# Patient Record
Sex: FEMALE | Race: WHITE | NOT HISPANIC OR LATINO | Employment: OTHER | URBAN - METROPOLITAN AREA
[De-identification: names, ages, dates, MRNs, and addresses within clinical notes are randomized per-mention and may not be internally consistent; named-entity substitution may affect disease eponyms.]

---

## 2017-02-17 ENCOUNTER — RECORDS - HEALTHEAST (OUTPATIENT)
Dept: LAB | Facility: CLINIC | Age: 57
End: 2017-02-17

## 2017-02-17 LAB
CHOLEST SERPL-MCNC: 188 MG/DL
FASTING STATUS PATIENT QL REPORTED: ABNORMAL
HDLC SERPL-MCNC: 40 MG/DL
LDLC SERPL CALC-MCNC: 105 MG/DL
TRIGL SERPL-MCNC: 215 MG/DL

## 2017-03-02 ENCOUNTER — AMBULATORY - HEALTHEAST (OUTPATIENT)
Dept: ADMINISTRATIVE | Facility: REHABILITATION | Age: 57
End: 2017-03-02

## 2017-03-02 DIAGNOSIS — H81.10 BPPV (BENIGN PAROXYSMAL POSITIONAL VERTIGO): ICD-10-CM

## 2017-03-17 ENCOUNTER — OFFICE VISIT - HEALTHEAST (OUTPATIENT)
Dept: OCCUPATIONAL THERAPY | Facility: REHABILITATION | Age: 57
End: 2017-03-17

## 2017-03-17 DIAGNOSIS — R26.81 UNSTEADINESS ON FEET: ICD-10-CM

## 2017-03-17 DIAGNOSIS — H81.12 BPPV (BENIGN PAROXYSMAL POSITIONAL VERTIGO), LEFT: ICD-10-CM

## 2017-03-17 DIAGNOSIS — Z78.9 DECREASED ACTIVITIES OF DAILY LIVING (ADL): ICD-10-CM

## 2017-03-30 ENCOUNTER — OFFICE VISIT - HEALTHEAST (OUTPATIENT)
Dept: OCCUPATIONAL THERAPY | Facility: REHABILITATION | Age: 57
End: 2017-03-30

## 2017-03-30 DIAGNOSIS — R26.81 UNSTEADINESS ON FEET: ICD-10-CM

## 2017-03-30 DIAGNOSIS — Z78.9 DECREASED ACTIVITIES OF DAILY LIVING (ADL): ICD-10-CM

## 2017-03-30 DIAGNOSIS — H81.12 BPPV (BENIGN PAROXYSMAL POSITIONAL VERTIGO), LEFT: ICD-10-CM

## 2017-04-07 ENCOUNTER — OFFICE VISIT - HEALTHEAST (OUTPATIENT)
Dept: OCCUPATIONAL THERAPY | Facility: REHABILITATION | Age: 57
End: 2017-04-07

## 2017-04-07 DIAGNOSIS — Z78.9 DECREASED ACTIVITIES OF DAILY LIVING (ADL): ICD-10-CM

## 2017-04-07 DIAGNOSIS — H81.12 BPPV (BENIGN PAROXYSMAL POSITIONAL VERTIGO), LEFT: ICD-10-CM

## 2017-04-07 DIAGNOSIS — R26.81 UNSTEADINESS ON FEET: ICD-10-CM

## 2018-03-07 ENCOUNTER — RECORDS - HEALTHEAST (OUTPATIENT)
Dept: LAB | Facility: CLINIC | Age: 58
End: 2018-03-07

## 2018-03-07 LAB
ALBUMIN SERPL-MCNC: 3.2 G/DL (ref 3.5–5)
ALP SERPL-CCNC: 84 U/L (ref 45–120)
ALT SERPL W P-5'-P-CCNC: 35 U/L (ref 0–45)
ANION GAP SERPL CALCULATED.3IONS-SCNC: 7 MMOL/L (ref 5–18)
AST SERPL W P-5'-P-CCNC: 38 U/L (ref 0–40)
BILIRUB SERPL-MCNC: 0.4 MG/DL (ref 0–1)
BUN SERPL-MCNC: 11 MG/DL (ref 8–22)
CALCIUM SERPL-MCNC: 9.6 MG/DL (ref 8.5–10.5)
CHLORIDE BLD-SCNC: 108 MMOL/L (ref 98–107)
CHOLEST SERPL-MCNC: 172 MG/DL
CO2 SERPL-SCNC: 23 MMOL/L (ref 22–31)
CREAT SERPL-MCNC: 0.78 MG/DL (ref 0.6–1.1)
CREAT UR-MCNC: 146.1 MG/DL
FASTING STATUS PATIENT QL REPORTED: ABNORMAL
GFR SERPL CREATININE-BSD FRML MDRD: >60 ML/MIN/1.73M2
GLUCOSE BLD-MCNC: 240 MG/DL (ref 70–125)
HDLC SERPL-MCNC: 37 MG/DL
LDLC SERPL CALC-MCNC: 92 MG/DL
MICROALBUMIN UR-MCNC: 4.68 MG/DL (ref 0–1.99)
MICROALBUMIN/CREAT UR: 32 MG/G
POTASSIUM BLD-SCNC: 3.9 MMOL/L (ref 3.5–5)
PROT SERPL-MCNC: 6.5 G/DL (ref 6–8)
SODIUM SERPL-SCNC: 138 MMOL/L (ref 136–145)
TRIGL SERPL-MCNC: 217 MG/DL

## 2018-09-25 ENCOUNTER — RECORDS - HEALTHEAST (OUTPATIENT)
Dept: LAB | Facility: CLINIC | Age: 58
End: 2018-09-25

## 2018-09-25 LAB
ALBUMIN SERPL-MCNC: 3.3 G/DL (ref 3.5–5)
ALP SERPL-CCNC: 106 U/L (ref 45–120)
ALT SERPL W P-5'-P-CCNC: 56 U/L (ref 0–45)
ANION GAP SERPL CALCULATED.3IONS-SCNC: 9 MMOL/L (ref 5–18)
AST SERPL W P-5'-P-CCNC: 43 U/L (ref 0–40)
BILIRUB SERPL-MCNC: 0.5 MG/DL (ref 0–1)
BUN SERPL-MCNC: 9 MG/DL (ref 8–22)
CALCIUM SERPL-MCNC: 10.2 MG/DL (ref 8.5–10.5)
CHLORIDE BLD-SCNC: 107 MMOL/L (ref 98–107)
CO2 SERPL-SCNC: 22 MMOL/L (ref 22–31)
CREAT SERPL-MCNC: 0.87 MG/DL (ref 0.6–1.1)
GFR SERPL CREATININE-BSD FRML MDRD: >60 ML/MIN/1.73M2
GLUCOSE BLD-MCNC: 329 MG/DL (ref 70–125)
POTASSIUM BLD-SCNC: 4.3 MMOL/L (ref 3.5–5)
PROT SERPL-MCNC: 6.6 G/DL (ref 6–8)
SODIUM SERPL-SCNC: 138 MMOL/L (ref 136–145)

## 2018-12-12 ENCOUNTER — RECORDS - HEALTHEAST (OUTPATIENT)
Dept: ADMINISTRATIVE | Facility: OTHER | Age: 58
End: 2018-12-12

## 2018-12-12 LAB
LAB AP CHARGES (HE HISTORICAL CONVERSION): NORMAL
PATH REPORT.COMMENTS IMP SPEC: NORMAL
PATH REPORT.COMMENTS IMP SPEC: NORMAL
PATH REPORT.FINAL DX SPEC: NORMAL
PATH REPORT.GROSS SPEC: NORMAL
PATH REPORT.MICROSCOPIC SPEC OTHER STN: NORMAL
PATH REPORT.RELEVANT HX SPEC: NORMAL
RESULT FLAG (HE HISTORICAL CONVERSION): NORMAL

## 2019-03-22 ENCOUNTER — RECORDS - HEALTHEAST (OUTPATIENT)
Dept: LAB | Facility: CLINIC | Age: 59
End: 2019-03-22

## 2019-03-22 LAB
ALBUMIN SERPL-MCNC: 3.3 G/DL (ref 3.5–5)
ALP SERPL-CCNC: 93 U/L (ref 45–120)
ALT SERPL W P-5'-P-CCNC: 51 U/L (ref 0–45)
ANION GAP SERPL CALCULATED.3IONS-SCNC: 7 MMOL/L (ref 5–18)
AST SERPL W P-5'-P-CCNC: 41 U/L (ref 0–40)
BILIRUB SERPL-MCNC: 0.5 MG/DL (ref 0–1)
BUN SERPL-MCNC: 9 MG/DL (ref 8–22)
CALCIUM SERPL-MCNC: 9.9 MG/DL (ref 8.5–10.5)
CHLORIDE BLD-SCNC: 106 MMOL/L (ref 98–107)
CHOLEST SERPL-MCNC: 155 MG/DL
CO2 SERPL-SCNC: 24 MMOL/L (ref 22–31)
CREAT SERPL-MCNC: 0.96 MG/DL (ref 0.6–1.1)
FASTING STATUS PATIENT QL REPORTED: NO
GFR SERPL CREATININE-BSD FRML MDRD: 59 ML/MIN/1.73M2
GLUCOSE BLD-MCNC: 388 MG/DL (ref 70–125)
HDLC SERPL-MCNC: 41 MG/DL
LDLC SERPL CALC-MCNC: 83 MG/DL
POTASSIUM BLD-SCNC: 4.4 MMOL/L (ref 3.5–5)
PROT SERPL-MCNC: 6.6 G/DL (ref 6–8)
SODIUM SERPL-SCNC: 137 MMOL/L (ref 136–145)
TRIGL SERPL-MCNC: 157 MG/DL

## 2019-04-23 ENCOUNTER — COMMUNICATION - HEALTHEAST (OUTPATIENT)
Dept: TELEHEALTH | Facility: CLINIC | Age: 59
End: 2019-04-23

## 2019-04-23 ENCOUNTER — HOSPITAL ENCOUNTER (OUTPATIENT)
Dept: MAMMOGRAPHY | Facility: CLINIC | Age: 59
Discharge: HOME | End: 2019-04-23
Attending: FAMILY MEDICINE

## 2019-04-23 DIAGNOSIS — Z12.31 SCREENING MAMMOGRAM, ENCOUNTER FOR: ICD-10-CM

## 2019-12-10 ENCOUNTER — RECORDS - HEALTHEAST (OUTPATIENT)
Dept: LAB | Facility: CLINIC | Age: 59
End: 2019-12-10

## 2019-12-11 LAB
ALBUMIN SERPL-MCNC: 3.3 G/DL (ref 3.5–5)
ALP SERPL-CCNC: 68 U/L (ref 45–120)
ALT SERPL W P-5'-P-CCNC: 32 U/L (ref 0–45)
ANION GAP SERPL CALCULATED.3IONS-SCNC: 8 MMOL/L (ref 5–18)
AST SERPL W P-5'-P-CCNC: 39 U/L (ref 0–40)
BILIRUB SERPL-MCNC: 0.7 MG/DL (ref 0–1)
BUN SERPL-MCNC: 10 MG/DL (ref 8–22)
CALCIUM SERPL-MCNC: 10.2 MG/DL (ref 8.5–10.5)
CHLORIDE BLD-SCNC: 107 MMOL/L (ref 98–107)
CO2 SERPL-SCNC: 26 MMOL/L (ref 22–31)
CREAT SERPL-MCNC: 0.98 MG/DL (ref 0.6–1.1)
GFR SERPL CREATININE-BSD FRML MDRD: 58 ML/MIN/1.73M2
GLUCOSE BLD-MCNC: 146 MG/DL (ref 70–125)
POTASSIUM BLD-SCNC: 4.2 MMOL/L (ref 3.5–5)
PROT SERPL-MCNC: 6.5 G/DL (ref 6–8)
SODIUM SERPL-SCNC: 141 MMOL/L (ref 136–145)

## 2020-03-04 ENCOUNTER — RECORDS - HEALTHEAST (OUTPATIENT)
Dept: LAB | Facility: CLINIC | Age: 60
End: 2020-03-04

## 2020-03-04 LAB
CHOLEST SERPL-MCNC: 161 MG/DL
FASTING STATUS PATIENT QL REPORTED: YES
HDLC SERPL-MCNC: 41 MG/DL
LDLC SERPL CALC-MCNC: 86 MG/DL
TRIGL SERPL-MCNC: 172 MG/DL

## 2020-07-27 ENCOUNTER — COMMUNICATION - HEALTHEAST (OUTPATIENT)
Dept: CARDIOLOGY | Facility: CLINIC | Age: 60
End: 2020-07-27

## 2020-07-28 ENCOUNTER — AMBULATORY - HEALTHEAST (OUTPATIENT)
Dept: CARDIOLOGY | Facility: CLINIC | Age: 60
End: 2020-07-28

## 2020-07-28 ENCOUNTER — COMMUNICATION - HEALTHEAST (OUTPATIENT)
Dept: CARDIOLOGY | Facility: CLINIC | Age: 60
End: 2020-07-28

## 2020-07-28 ENCOUNTER — OFFICE VISIT - HEALTHEAST (OUTPATIENT)
Dept: CARDIOLOGY | Facility: CLINIC | Age: 60
End: 2020-07-28

## 2020-07-28 DIAGNOSIS — R79.89 POSITIVE D DIMER: ICD-10-CM

## 2020-07-28 DIAGNOSIS — R06.02 SOB (SHORTNESS OF BREATH): ICD-10-CM

## 2020-07-28 DIAGNOSIS — R07.89 OTHER CHEST PAIN: ICD-10-CM

## 2020-07-28 DIAGNOSIS — R06.09 DYSPNEA ON EXERTION: ICD-10-CM

## 2020-07-28 LAB — D DIMER PPP FEU-MCNC: 0.85 FEU UG/ML

## 2020-07-28 RX ORDER — PIOGLITAZONEHYDROCHLORIDE 15 MG/1
15 TABLET ORAL DAILY
Status: SHIPPED | COMMUNITY
Start: 2020-05-01 | End: 2023-01-01

## 2020-07-28 RX ORDER — GLIPIZIDE 10 MG/1
10 TABLET, FILM COATED, EXTENDED RELEASE ORAL 2 TIMES DAILY
Status: SHIPPED | COMMUNITY
Start: 2020-05-27

## 2020-07-28 RX ORDER — DOXAZOSIN 8 MG/1
4 TABLET ORAL DAILY
Status: SHIPPED | COMMUNITY
Start: 2019-05-07

## 2020-07-28 RX ORDER — HYDROCHLOROTHIAZIDE 25 MG/1
25 TABLET ORAL DAILY
Status: SHIPPED | COMMUNITY
Start: 2020-05-28

## 2020-07-28 RX ORDER — METFORMIN HCL 500 MG
1000 TABLET, EXTENDED RELEASE 24 HR ORAL DAILY
Status: SHIPPED | COMMUNITY
Start: 2020-06-10 | End: 2023-01-01

## 2020-07-28 ASSESSMENT — MIFFLIN-ST. JEOR: SCORE: 2046.41

## 2020-07-29 ENCOUNTER — HOSPITAL ENCOUNTER (OUTPATIENT)
Dept: CT IMAGING | Facility: CLINIC | Age: 60
Discharge: HOME | End: 2020-07-29
Attending: INTERNAL MEDICINE

## 2020-07-29 DIAGNOSIS — R79.89 POSITIVE D DIMER: ICD-10-CM

## 2020-07-29 DIAGNOSIS — R06.02 SOB (SHORTNESS OF BREATH): ICD-10-CM

## 2020-08-10 ENCOUNTER — HOSPITAL ENCOUNTER (OUTPATIENT)
Dept: NUCLEAR MEDICINE | Facility: CLINIC | Age: 60
Discharge: HOME | End: 2020-08-10
Attending: INTERNAL MEDICINE

## 2020-08-10 ENCOUNTER — HOSPITAL ENCOUNTER (OUTPATIENT)
Dept: CARDIOLOGY | Facility: CLINIC | Age: 60
Discharge: HOME | End: 2020-08-10
Attending: INTERNAL MEDICINE

## 2020-08-10 LAB
CV STRESS CURRENT BP HE: NORMAL
CV STRESS CURRENT HR HE: 50
CV STRESS CURRENT HR HE: 52
CV STRESS CURRENT HR HE: 52
CV STRESS CURRENT HR HE: 54
CV STRESS CURRENT HR HE: 54
CV STRESS CURRENT HR HE: 56
CV STRESS CURRENT HR HE: 57
CV STRESS CURRENT HR HE: 58
CV STRESS CURRENT HR HE: 60
CV STRESS CURRENT HR HE: 60
CV STRESS CURRENT HR HE: 61
CV STRESS CURRENT HR HE: 61
CV STRESS CURRENT HR HE: 63
CV STRESS CURRENT HR HE: 64
CV STRESS CURRENT HR HE: 68
CV STRESS CURRENT HR HE: 69
CV STRESS CURRENT HR HE: 73
CV STRESS CURRENT HR HE: 74
CV STRESS CURRENT HR HE: 87
CV STRESS DEVIATION TIME HE: NORMAL
CV STRESS ECHO PERCENT HR HE: NORMAL
CV STRESS EXERCISE STAGE HE: NORMAL
CV STRESS FINAL RESTING BP HE: NORMAL
CV STRESS FINAL RESTING HR HE: 61
CV STRESS MAX HR HE: 92
CV STRESS MAX TREADMILL GRADE HE: 0
CV STRESS MAX TREADMILL SPEED HE: 0
CV STRESS PEAK DIA BP HE: NORMAL
CV STRESS PEAK SYS BP HE: NORMAL
CV STRESS PHASE HE: NORMAL
CV STRESS PROTOCOL HE: NORMAL
CV STRESS RESTING PT POSITION HE: NORMAL
CV STRESS RESTING PT POSITION HE: NORMAL
CV STRESS ST DEVIATION AMOUNT HE: NORMAL
CV STRESS ST DEVIATION ELEVATION HE: NORMAL
CV STRESS ST EVELATION AMOUNT HE: NORMAL
CV STRESS TEST TYPE HE: NORMAL
CV STRESS TOTAL STAGE TIME MIN 1 HE: NORMAL
RATE PRESSURE PRODUCT: NORMAL
STRESS ECHO BASELINE DIASTOLIC HE: 83
STRESS ECHO BASELINE HR: 73
STRESS ECHO BASELINE SYSTOLIC BP: 141
STRESS ECHO CALCULATED PERCENT HR: 58 %
STRESS ECHO LAST STRESS DIASTOLIC BP: 60
STRESS ECHO LAST STRESS HR: 57
STRESS ECHO LAST STRESS SYSTOLIC BP: 130
STRESS ECHO TARGET HR: 159

## 2020-08-14 ENCOUNTER — COMMUNICATION - HEALTHEAST (OUTPATIENT)
Dept: CARDIOLOGY | Facility: CLINIC | Age: 60
End: 2020-08-14

## 2020-12-02 ENCOUNTER — RECORDS - HEALTHEAST (OUTPATIENT)
Dept: ADMINISTRATIVE | Facility: OTHER | Age: 60
End: 2020-12-02

## 2020-12-02 LAB
LAB AP CHARGES (HE HISTORICAL CONVERSION): NORMAL
PATH REPORT.COMMENTS IMP SPEC: NORMAL
PATH REPORT.FINAL DX SPEC: NORMAL
PATH REPORT.GROSS SPEC: NORMAL
PATH REPORT.MICROSCOPIC SPEC OTHER STN: NORMAL
PATH REPORT.RELEVANT HX SPEC: NORMAL
RESULT FLAG (HE HISTORICAL CONVERSION): NORMAL

## 2021-03-11 ENCOUNTER — RECORDS - HEALTHEAST (OUTPATIENT)
Dept: LAB | Facility: CLINIC | Age: 61
End: 2021-03-11

## 2021-03-11 LAB
ALBUMIN SERPL-MCNC: 3.2 G/DL (ref 3.5–5)
ALP SERPL-CCNC: 79 U/L (ref 45–120)
ALT SERPL W P-5'-P-CCNC: 43 U/L (ref 0–45)
ANION GAP SERPL CALCULATED.3IONS-SCNC: 10 MMOL/L (ref 5–18)
AST SERPL W P-5'-P-CCNC: 69 U/L (ref 0–40)
BILIRUB SERPL-MCNC: 0.6 MG/DL (ref 0–1)
BUN SERPL-MCNC: 14 MG/DL (ref 8–22)
CALCIUM SERPL-MCNC: 9.6 MG/DL (ref 8.5–10.5)
CHLORIDE BLD-SCNC: 107 MMOL/L (ref 98–107)
CHOLEST SERPL-MCNC: 167 MG/DL
CO2 SERPL-SCNC: 23 MMOL/L (ref 22–31)
CREAT SERPL-MCNC: 1.09 MG/DL (ref 0.6–1.1)
FASTING STATUS PATIENT QL REPORTED: ABNORMAL
GFR SERPL CREATININE-BSD FRML MDRD: 51 ML/MIN/1.73M2
GLUCOSE BLD-MCNC: 227 MG/DL (ref 70–125)
HDLC SERPL-MCNC: 38 MG/DL
LDLC SERPL CALC-MCNC: 99 MG/DL
POTASSIUM BLD-SCNC: 4.1 MMOL/L (ref 3.5–5)
PROT SERPL-MCNC: 6.6 G/DL (ref 6–8)
SODIUM SERPL-SCNC: 140 MMOL/L (ref 136–145)
TRIGL SERPL-MCNC: 149 MG/DL

## 2021-05-25 ENCOUNTER — RECORDS - HEALTHEAST (OUTPATIENT)
Dept: ADMINISTRATIVE | Facility: CLINIC | Age: 61
End: 2021-05-25

## 2021-05-27 ENCOUNTER — RECORDS - HEALTHEAST (OUTPATIENT)
Dept: ADMINISTRATIVE | Facility: CLINIC | Age: 61
End: 2021-05-27

## 2021-05-28 ENCOUNTER — RECORDS - HEALTHEAST (OUTPATIENT)
Dept: ADMINISTRATIVE | Facility: CLINIC | Age: 61
End: 2021-05-28

## 2021-05-29 ENCOUNTER — RECORDS - HEALTHEAST (OUTPATIENT)
Dept: ADMINISTRATIVE | Facility: CLINIC | Age: 61
End: 2021-05-29

## 2021-05-30 ENCOUNTER — RECORDS - HEALTHEAST (OUTPATIENT)
Dept: ADMINISTRATIVE | Facility: CLINIC | Age: 61
End: 2021-05-30

## 2021-06-02 ENCOUNTER — RECORDS - HEALTHEAST (OUTPATIENT)
Dept: ADMINISTRATIVE | Facility: CLINIC | Age: 61
End: 2021-06-02

## 2021-06-04 VITALS
DIASTOLIC BLOOD PRESSURE: 70 MMHG | HEART RATE: 68 BPM | RESPIRATION RATE: 16 BRPM | WEIGHT: 293 LBS | SYSTOLIC BLOOD PRESSURE: 118 MMHG | BODY MASS INDEX: 50.02 KG/M2 | HEIGHT: 64 IN

## 2021-06-09 NOTE — PROGRESS NOTES
[facility] Daily Progress     Patient Name: Lisandra Elias  Date: 2017  Visit #: 3  Referral Diagnosis: BPPV  Referring provider: Pritesh Ward MD  Visit Diagnosis:     ICD-10-CM    1. BPPV (benign paroxysmal positional vertigo), left H81.12    2. Unsteadiness on feet R26.81    3. Decreased activities of daily living (ADL) Z78.9          Assessment:     Patient is appropriate to continue with skilled occupational therapy intervention, as indicated by initial plan of care. Patient states that she is improved but symptoms are not resolved.Instructed on VOR exercise today.    Goal Status:  Patient able to perform bed mobility: without vertigo;in 12 weeks  Patient will turn head: without vertigo;without dizziness;for driving;for work;in 12 weeks  Patient will look up / down: without vertigo;without dizziness;for drinking;for reading;in 12 weeks    Plan / Patient Education:     Continue with initial plan of care. Progress with home program as tolerated.    Subjective:     Pain Ratin    Objective:   Subjective progress relative to last visit:  Intensity of dizziness is:  less  Frequency of dizziness is: less  Duration of dizziness is: less  Balance is:  better    Positional Tests:  Hallpike Right:  Negative  Hallpike Left:  Negative  Head Roll Right: Negative  Head Roll Left:  Negative    Treatment Today:   X1 viewing-5 seconds-instructed  TREATMENT MINUTES COMMENTS   Evaluation     Self-care/ Home management     Neuromuscular Re-education 15    Canalith repositioning procedure           Total 15    Blank areas are intentional and mean the treatment did not include these items.          Yarely Mcgill  2017  12:59 PM

## 2021-06-09 NOTE — PROGRESS NOTES
[facility] Daily Progress     Patient Name: Lisandra Elias  Date: 3/30/2017  Visit #: 2  Referral Diagnosis: BPPV  Referring provider: Pritesh Ward MD  Visit Diagnosis:     ICD-10-CM    1. BPPV (benign paroxysmal positional vertigo), left H81.12    2. Unsteadiness on feet R26.81    3. Decreased activities of daily living (ADL) Z78.9          Assessment:     Patient is appropriate to continue with skilled occupational therapy intervention, as indicated by initial plan of care. Patient states that she is significantly improved but symptoms are not resolved. Treated with left Epley maneuver today.    Goal Status:  Patient able to perform bed mobility: without vertigo;in 12 weeks  Patient will turn head: without vertigo;without dizziness;for driving;for work;in 12 weeks  Patient will look up / down: without vertigo;without dizziness;for drinking;for reading;in 12 weeks    Plan / Patient Education:     Continue with initial plan of care. Progress with home program as tolerated.    Subjective:     Pain Ratin    Objective:   Subjective progress relative to last visit:  Intensity of dizziness is:  less  Frequency of dizziness is: less  Duration of dizziness is: less  Balance is:  better    Positional Tests:  Hallpike Right:  Negative  Hallpike Left:  Abnormal - Nystagmus left torsional, up beating    Treatment Today: Treated with left Epley maneuver X 2. Signs and symptoms appear negative on the second Epley.  TREATMENT MINUTES COMMENTS   Evaluation     Self-care/ Home management     Neuromuscular Re-education     Canalith repositioning procedure 25          Total 25    Blank areas are intentional and mean the treatment did not include these items.          Yarely Mcgill  3/30/2017  12:59 PM

## 2021-06-09 NOTE — PROGRESS NOTES
[facility]   Vestibular Initial Evaluation    Patient Name: Lisandra Elias  Date of evaluation: 3/17/2017  Referral Diagnosis: BPPV  Referring provider: Pritesh Ward MD  Visit Diagnosis:     ICD-10-CM    1. BPPV (benign paroxysmal positional vertigo), left H81.12    2. Unsteadiness on feet R26.81    3. Decreased activities of daily living (ADL) Z78.9        Assessment:      Patient has positive left [place] - Hallpikes. Treated with left Epley maneuver.    Goals:  Patient able to perform bed mobility: without vertigo;in 12 weeks  Patient will turn head: without vertigo;without dizziness;for driving;for work;in 12 weeks  Patient will look up / down: without vertigo;without dizziness;for drinking;for reading;in 12 weeks    Patient's expectations/goals are realistic.    Barriers to Learning or Achieving Goals:  No Barriers.       Plan / Patient Instructions:        Plan of Care:   Communication with: Referral Source  Patient Related Instruction: Nature of Condition;Treatment plan and rationale;Basis of treatment;Expected outcome  Times per Week: 1  Number of Weeks: 12  Number of Visits: 12  Neuromuscular Reeducation: vestibular  Canolith Repostioning:      Plan for next visit: repeat left Epley maneuver     Subjective:         History of Present Illness:    Lisandra is a 56 y.o. female who presents to therapy today with complaints of vertigo, nausea and unsteadiness. Patient had sudden onset of symptoms about 2 weeks ago. Symptoms are intermittent. She has a history of similar symptoms in  that were successfully treated with VRT. Patient reports a constant bilateral ear pain. Patient denies hearing changes. She does have migraine headaches.    Pain Ratin    Functional limitations are described as occurring with:   balance, bending, bed mobility, head turns, looking up or down, stepping over curbs       Objective:      Note: Items left blank indicates the item was not performed or not indicated at the  time of the evaluation.    Patient Outcome Measures: DHI 52      Vestibular Disorder Examination  1. BPPV (benign paroxysmal positional vertigo), left     2. Unsteadiness on feet     3. Decreased activities of daily living (ADL)       Precautions/Restrictions: None  Posture Observation:      General standing posture is fair.    ROM:  Not Tested    Strength: Not Tested    Functional Mobility: fair      Oculomotor Assessment: Not tested    VOR Function: Not tested    Positional Tests:  Hallpike Right:  Negative  Hallpike Left:  Abnormal - Nystagmus left torsional, up beating    Balance Assessment: Not tested    Treatment Today:  Treated with left Epley maneuver X2. Signs and symptoms appear negative on second treatment.  TREATMENT MINUTES COMMENTS   Evaluation 15    Self-care/ Home management     Neuromuscular Re-education     Canalith repositioning procedure 20          Total 35    Blank areas are intentional and mean the treatment did not include these items.     OT Evaluation Code: (Please list factors)   Comorbidities: None   Profile/History Review: Brief    Need for eval modification: No    # Treatment options: Limited    Clinical Decision Making:  Low      Occupational Profile/ Medical and Therapy History and Comorbidities Occupational Performance Clinical Decision Making   (Complexity)   brief history with review of medical/therapy records related to the presenting problem.  No comorbidities 1-3 Performance deficits that result in activity limitations and/or participation restrictions.    No Assessment Modification  Low complexity, which includes  problem-focused assessments, and consideration of a limited number of treatment options.      expanded review of medical/therapy records and additional review of physical, cognitive and psychosocial history.    May have comorbidities 3-5 Performance deficits that result in activity limitations and/or participation restrictions.    Minimal to moderate modification of  assessment Moderate complexity, which includes analysis of data from detailed assessments, and consideration of several treatment options.         Review of medical/therapy records and extensive additional review of physical, cognitive and psychosocial history.  Comorbidities affect occupational performance 5 or more Performance deficits that result in activity limitations and/or participation restrictions.    Significant modification of assessment High complexity, analysis of  Occupational profile and data,  Comprehensive assessments, multiple treatment options.            Yarely Mcgill  3/17/2017  8:39 AM

## 2021-06-10 NOTE — PROGRESS NOTES
[facility] Discharge Summary  Patient Name: Lisandra Elias  Date: 5/23/2017  Referral Diagnosis: BPPV  Referring provider: Pritesh Ward MD  Visit Diagnosis:   1. BPPV (benign paroxysmal positional vertigo), left     2. Unsteadiness on feet     3. Decreased activities of daily living (ADL)         Goals: Progressing toward  Patient able to perform bed mobility: without vertigo;in 12 weeks  Patient will turn head: without vertigo;without dizziness;for driving;for work;in 12 weeks  Patient will look up / down: without vertigo;without dizziness;for drinking;for reading;in 12 weeks    Patient was seen for 3 visits between 3-17-17 and 4-7-17.    Therapy will be discontinued at this time.  The patient will need a new referral to resume.    Thank you for your referral.  Yarely Mcgill  5/23/2017  7:35 AM

## 2021-06-10 NOTE — TELEPHONE ENCOUNTER
Called patient and advised the she will receive call with results as soon as test is reviewed by Dr. Nava. Patient verbalized understanding. -ejb    ----- Message from Nemesio Estrada sent at 8/14/2020 12:23 PM CDT -----  Regarding: [facility] PT / TEST RESULTS  General phone call:    Caller: Lisandra Bolaños     Primary cardiologist: DANIEL     Detailed reason for call: Pt states she had a stress test on 8/10 but has not heard back regarding her results. Please call pt back.     Best phone number: [phone]    Best time to contact: Anytime     Ok to leave a detailed message? Yes     Device? No     Additional Info: Pt has a follow up phone appt with [facility] on 9/10/20.

## 2021-06-10 NOTE — TELEPHONE ENCOUNTER
Received late-nurse call from pt who was very anxious about elevated D-dimer results. Pt reports she was instructed by nursing staff to schedule an urgent CT (see D-dimer results comments). Pt was able to be scheduled for tomorrow morning at 7 AM. She asks if it is ok to wait till tomorrow or does she need to seek ED services. Informed pt that if she developed any new or worsening symptoms such as SOB she should seek ED. Also informed her that she can call our clinic number after hours and be transferred to care [facility] if urgent questions come up.

## 2021-06-10 NOTE — TELEPHONE ENCOUNTER
Wellness Screening Tool  Symptom Screening:  Do you have one of the following NEW symptoms:    Fever (subjective or >100.0)?  No    A new cough?  No    Shortness of breath?  No     Chills? No     New loss of taste or smell? No     Generalized body aches? No     New persistent headache? No     New sore throat? No     Nausea, vomiting, or diarrhea?  No    Within the past 3 weeks, have you been exposed to someone with a known positive illness below:    COVID-19 (known or suspected)?  No    Chicken pox?  No    Mealses?  No    Pertussis?  No    Patient notified of visitor policy- They may have one person accompany them to their appointment, but they will need to wear a mask and will be screened upon arrival for symptoms: Yes  Pt informed to wear a mask: Yes  Pt notified if they develop any symptoms listed above, prior to their appointment, they are to call the clinic directly at [phone] for further instructions.  Yes  Patient's appointment status: Patient will be seen in clinic as scheduled on Tues 7-28-20 @ 1120 in [facility] with Dr. Nava.  mg

## 2021-06-14 ENCOUNTER — HOSPITAL ENCOUNTER (OUTPATIENT)
Dept: MAMMOGRAPHY | Facility: CLINIC | Age: 61
Discharge: HOME | End: 2021-06-14
Payer: COMMERCIAL

## 2021-06-14 DIAGNOSIS — Z12.31 VISIT FOR SCREENING MAMMOGRAM: ICD-10-CM

## 2021-06-29 NOTE — PROGRESS NOTES
Progress Notes by Georgie Nava MD at 7/28/2020 11:20 AM     Author: Georgie Nava MD Service: -- Author Type: Physician    Filed: 7/29/2020 11:36 AM Encounter Date: 7/28/2020 Status: Signed    : Georgie Nava MD (Physician)           Thank you, Dr. Asif, for asking us to see Lisandra Elias at the [facility].      Assessment/Recommendations   Assessment:    1. Pain: chest pain, pain down left arm not necessarily with exertion.  May be related to anxiety  2. hypertension  3. Morbid obesity  4. Diabetes mellitus type II    Plan:  1. Check D dimer  2. Lexiscan nuclear stress testing  3. Follow up with PMD for further evaluation if continued symptoms and stress testing unremarkable  Follow up with me in one months.  If further issues with palpitations consider dilan monitor     History of Present Illness    Ms. Lisandra Elias is a 59 y.o. female with history of diabetes mellitus type II, hypertension, obesity, anxiety who is here in rapid access clinic due to various symptoms after being referred from the ED.  She has been feeling palpitations, nausea, discomfort in the upper back radiating to her left arm.   The pain is not necessarily with exertion. She occasionally feels short of breath with nausea.  No fevers or cough.  No exertional symptoms.  She also has intermittent pain in the lower chest upper stomach.          Physical Examination Review of Systems   Vitals:    07/28/20 1125   BP: 118/70   Pulse: 68   Resp: 16     Body mass index is 56.82 kg/m .  Wt Readings from Last 3 Encounters:   07/28/20 (!) 331 lb (150.1 kg)   07/18/20 (!) 315 lb (142.9 kg)   03/17/19 (!) 282 lb (127.9 kg)       General Appearance:   alert, no apparent distress   HEENT:  no scleral icterus; the mucous membranes are pink and moist                                  Neck: No jvd   Chest: the spine is straight and the chest is symmetric   Lungs:   respirations unlabored; the lungs  are clear to auscultation   Cardiovascular:   regular rhythm with normal first and second heart sounds and no murmurs or gallops   Abdomen:  no organomegaly, masses, bruits, or tenderness; bowel sounds are present   Extremities: no cyanosis, clubbing, or edema   Skin: no xanthelasma    General: Weight Gain  Eyes: WNL  Ears/Nose/Throat: WNL  Lungs: WNL  Heart: Chest Pain, Arm Pain, Shortness of Breath with activity, Leg Swelling(palpitpations)  Stomach: Constipation, Nausea  Bladder: Frequent Urination at Night  Muscle/Joints: Muscle Weakness  Skin: WNL  Nervous System: Dizziness  Mental Health: WNL     Blood: WNL     Medical History  Surgical History Family History Social History   Past Medical History:   Diagnosis Date   ? Chronic pain    ? Diabetes mellitus (H)    ? History of anesthesia complications difficulty waking up   ? HTN (hypertension)    ? Migraine     Past Surgical History:   Procedure Laterality Date   ? APPENDECTOMY     ? HYSTERECTOMY N/A 2000's   ? OOPHORECTOMY Bilateral 2000's   ? ND EDG US EXAM SURGICAL ALTER STOM DUODENUM/JEJUNUM N/A 11/10/2014    Procedure: ESOPHAGOGASTRODUODENOSCOPY/ENDOSCOPIC ULTRASOUND;  Surgeon: rBian Lawrence MD;  Location: [facility];  Service: Gastroenterology   ? WISDOM TOOTH EXTRACTION N/A     Family History   Problem Relation Age of Onset   ? Heart disease Father     Social History     Socioeconomic History   ? Marital status:      Spouse name: Not on file   ? Number of children: Not on file   ? Years of education: Not on file   ? Highest education level: Not on file   Occupational History   ? Not on file   Social Needs   ? Financial resource strain: Not on file   ? Food insecurity     Worry: Not on file     Inability: Not on file   ? Transportation needs     Medical: Not on file     Non-medical: Not on file   Tobacco Use   ? Smoking status: Former Smoker     Packs/day: 1.00     Types: Cigarettes     Start date: 1974     Last attempt to quit: 1979      Years since quittin.6   ? Smokeless tobacco: Never Used   Substance and Sexual Activity   ? Alcohol use: Yes     Alcohol/week: 1.0 standard drinks     Types: 1 Glasses of wine per week   ? Drug use: Never   ? Sexual activity: Not on file   Lifestyle   ? Physical activity     Days per week: Not on file     Minutes per session: Not on file   ? Stress: Not on file   Relationships   ? Social connections     Talks on phone: Not on file     Gets together: Not on file     Attends Buddhism service: Not on file     Active member of club or organization: Not on file     Attends meetings of clubs or organizations: Not on file     Relationship status: Not on file   ? Intimate partner violence     Fear of current or ex partner: Not on file     Emotionally abused: Not on file     Physically abused: Not on file     Forced sexual activity: Not on file   Other Topics Concern   ? Not on file   Social History Narrative   ? Not on file          Medications  Allergies   Current Outpatient Medications   Medication Sig Dispense Refill   ? aspirin 81 MG EC tablet Take 81 mg by mouth daily.     ? blood sugar diagnostic (GLUCOSE BLOOD) Strp OneTouch Ultra Blue In Vitro Strip     Test 3 times daily     ? cefdinir (OMNICEF) 300 MG capsule Take 300 mg by mouth 2 (two) times a day.     ? doxazosin (CARDURA) 8 MG tablet Take 8 mg by mouth daily.     ? fluconazole (DIFLUCAN) 50 MG tablet Take 50 mg by mouth daily.     ? fluticasone propionate (FLONASE) 50 mcg/actuation nasal spray Apply 1 spray into each nostril daily.     ? glipiZIDE (GLUCOTROL XL) 10 MG 24 hr tablet Take 10 mg by mouth daily.     ? hydroCHLOROthiazide (HYDRODIURIL) 25 MG tablet Take 25 mg by mouth daily.     ? LANCETS MISC OneTouch Lancets Miscellaneous     ? losartan (COZAAR) 100 MG tablet Take 100 mg by mouth daily.     ? metFORMIN (GLUCOPHAGE-XR) 500 MG 24 hr tablet Take 1,000 mg by mouth daily.     ? metoprolol (TOPROL-XL) 50 MG 24 hr tablet Take 50 mg by mouth daily.      ? pioglitazone (ACTOS) 15 MG tablet Take 15 mg by mouth daily.     ? albuterol (ACCUNEB) 0.63 mg/3 mL nebulizer solution Take 1 ampule by nebulization 2 (two) times a day.     ? azithromycin (ZITHROMAX) 250 MG tablet Take 250 mg by mouth daily. Take 500 mg (2 x 250 mg tablets) on day 1 followed by 250 mg (1 tablet) on days 2-5.     ? ergocalciferol (VITAMIN D2) 50,000 unit capsule Take 50,000 Units by mouth once a week. 2,000 U     ? HYDROcodone-acetaminophen (XODOL) 5-300 mg per tablet Take 1 tablet by mouth. Every 4-6 hours as needed for pain     ? hydrOXYzine pamoate (VISTARIL) 25 MG capsule Take 1 capsule (25 mg total) by mouth 3 (three) times a day as needed for anxiety. 30 capsule 0   ? loperamide (IMODIUM A-D) 2 mg tablet Take 1 mg by mouth daily.     ? loratadine (CLARITIN) 10 mg tablet Take 10 mg by mouth daily.     ? multivitamin therapeutic (THERAGRAN) tablet Take 1 tablet by mouth daily.     ? traMADol (ULTRAM) 50 mg tablet Take 50 mg by mouth every 6 (six) hours as needed for pain.       No current facility-administered medications for this visit.       Allergies   Allergen Reactions   ? Amlodipine    ? Amoxicillin-Pot Clavulanate    ? Clotrimazole-Betamethasone    ? Codeine    ? Gemfibrozil    ? Ibuprofen    ? Lisinopril          Lab Results    Chemistry/lipid CBC Cardiac Enzymes/BNP/TSH/INR   Lab Results   Component Value Date    CHOL 161 03/04/2020    HDL 41 (L) 03/04/2020    LDLCALC 86 03/04/2020    TRIG 172 (H) 03/04/2020    CREATININE 0.92 07/18/2020    BUN 9 07/18/2020    K 4.3 07/18/2020     07/18/2020     07/18/2020    CO2 20 (L) 07/18/2020    Lab Results   Component Value Date    WBC 5.9 07/18/2020    HGB 14.5 07/18/2020    HCT 43.1 07/18/2020    MCV 91 07/18/2020     (L) 07/18/2020    Lab Results   Component Value Date    CKMB 3 10/31/2013    TROPONINI <0.01 07/18/2020    BNP 34 07/18/2020    INR 1.13 (H) 10/31/2013

## 2021-07-13 ENCOUNTER — RECORDS - HEALTHEAST (OUTPATIENT)
Dept: ADMINISTRATIVE | Facility: CLINIC | Age: 61
End: 2021-07-13

## 2021-07-21 ENCOUNTER — RECORDS - HEALTHEAST (OUTPATIENT)
Dept: ADMINISTRATIVE | Facility: CLINIC | Age: 61
End: 2021-07-21

## 2021-09-14 ENCOUNTER — HOSPITAL ENCOUNTER (EMERGENCY)
Facility: CLINIC | Age: 61
Discharge: HOME | End: 2021-09-15
Attending: EMERGENCY MEDICINE | Admitting: EMERGENCY MEDICINE
Payer: COMMERCIAL

## 2021-09-14 DIAGNOSIS — N30.91 HEMORRHAGIC CYSTITIS: ICD-10-CM

## 2021-09-14 PROCEDURE — 99283 EMERGENCY DEPT VISIT LOW MDM: CPT

## 2021-09-14 PROCEDURE — 87086 URINE CULTURE/COLONY COUNT: CPT | Performed by: EMERGENCY MEDICINE

## 2021-09-15 VITALS
HEART RATE: 84 BPM | RESPIRATION RATE: 24 BRPM | TEMPERATURE: 98.8 F | WEIGHT: 293 LBS | SYSTOLIC BLOOD PRESSURE: 133 MMHG | OXYGEN SATURATION: 94 % | DIASTOLIC BLOOD PRESSURE: 72 MMHG | BODY MASS INDEX: 57.16 KG/M2

## 2021-09-15 LAB
ALBUMIN SERPL-MCNC: 3.3 G/DL (ref 3.5–5)
ALP SERPL-CCNC: 62 U/L (ref 45–120)
ALT SERPL W P-5'-P-CCNC: 22 U/L (ref 0–45)
ANION GAP SERPL CALCULATED.3IONS-SCNC: 9 MMOL/L (ref 5–18)
AST SERPL W P-5'-P-CCNC: 29 U/L (ref 0–40)
BASOPHILS # BLD AUTO: 0.1 10E3/UL (ref 0–0.2)
BASOPHILS NFR BLD AUTO: 1 %
BILIRUB SERPL-MCNC: 0.5 MG/DL (ref 0–1)
BUN SERPL-MCNC: 16 MG/DL (ref 8–22)
CALCIUM SERPL-MCNC: 10.2 MG/DL (ref 8.5–10.5)
CHLORIDE BLD-SCNC: 108 MMOL/L (ref 98–107)
CO2 SERPL-SCNC: 25 MMOL/L (ref 22–31)
CREAT SERPL-MCNC: 1.13 MG/DL (ref 0.6–1.1)
EOSINOPHIL # BLD AUTO: 0.2 10E3/UL (ref 0–0.7)
EOSINOPHIL NFR BLD AUTO: 2 %
ERYTHROCYTE [DISTWIDTH] IN BLOOD BY AUTOMATED COUNT: 13.9 % (ref 10–15)
GFR SERPL CREATININE-BSD FRML MDRD: 52 ML/MIN/1.73M2
GLUCOSE BLD-MCNC: 158 MG/DL (ref 70–125)
HCT VFR BLD AUTO: 44.4 % (ref 35–47)
HGB BLD-MCNC: 14.7 G/DL (ref 11.7–15.7)
IMM GRANULOCYTES # BLD: 0 10E3/UL
IMM GRANULOCYTES NFR BLD: 0 %
INR PPP: 1.1 (ref 0.85–1.15)
LYMPHOCYTES # BLD AUTO: 2.5 10E3/UL (ref 0.8–5.3)
LYMPHOCYTES NFR BLD AUTO: 31 %
MCH RBC QN AUTO: 30.3 PG (ref 26.5–33)
MCHC RBC AUTO-ENTMCNC: 33.1 G/DL (ref 31.5–36.5)
MCV RBC AUTO: 92 FL (ref 78–100)
MONOCYTES # BLD AUTO: 1.2 10E3/UL (ref 0–1.3)
MONOCYTES NFR BLD AUTO: 14 %
NEUTROPHILS # BLD AUTO: 4.3 10E3/UL (ref 1.6–8.3)
NEUTROPHILS NFR BLD AUTO: 52 %
NRBC # BLD AUTO: 0 10E3/UL
NRBC BLD AUTO-RTO: 0 /100
PLATELET # BLD AUTO: 118 10E3/UL (ref 150–450)
POTASSIUM BLD-SCNC: 3.7 MMOL/L (ref 3.5–5)
PROT SERPL-MCNC: 6.8 G/DL (ref 6–8)
RBC # BLD AUTO: 4.85 10E6/UL (ref 3.8–5.2)
SODIUM SERPL-SCNC: 142 MMOL/L (ref 136–145)
WBC # BLD AUTO: 8.2 10E3/UL (ref 4–11)

## 2021-09-15 PROCEDURE — 85025 COMPLETE CBC W/AUTO DIFF WBC: CPT | Performed by: EMERGENCY MEDICINE

## 2021-09-15 PROCEDURE — 250N000013 HC RX MED GY IP 250 OP 250 PS 637: Performed by: EMERGENCY MEDICINE

## 2021-09-15 PROCEDURE — 85610 PROTHROMBIN TIME: CPT | Performed by: EMERGENCY MEDICINE

## 2021-09-15 PROCEDURE — 36415 COLL VENOUS BLD VENIPUNCTURE: CPT | Performed by: EMERGENCY MEDICINE

## 2021-09-15 PROCEDURE — 80053 COMPREHEN METABOLIC PANEL: CPT | Performed by: EMERGENCY MEDICINE

## 2021-09-15 RX ORDER — FLUCONAZOLE 150 MG/1
TABLET ORAL
Qty: 2 TABLET | Refills: 0 | Status: SHIPPED | OUTPATIENT
Start: 2021-09-15 | End: 2021-09-18

## 2021-09-15 RX ORDER — CEPHALEXIN 500 MG/1
500 CAPSULE ORAL ONCE
Status: COMPLETED | OUTPATIENT
Start: 2021-09-15 | End: 2021-09-15

## 2021-09-15 RX ORDER — CEPHALEXIN 500 MG/1
500 CAPSULE ORAL 4 TIMES DAILY
Qty: 28 CAPSULE | Refills: 0 | Status: SHIPPED | OUTPATIENT
Start: 2021-09-15 | End: 2021-09-22

## 2021-09-15 RX ADMIN — CEPHALEXIN 500 MG: 500 CAPSULE ORAL at 01:14

## 2021-09-15 NOTE — ED TRIAGE NOTES
Pt noticed blood in urine and urinary frequency around 2100 and has been increasing in amount. Reports feeling bloated but denies abdominal pain.

## 2021-09-15 NOTE — ED PROVIDER NOTES
"EMERGENCY DEPARTMENT ENCOUNTER      NAME: Lisandra Elias  AGE: 61 year old female  YOB: 1960  MRN: [id number]  EVALUATION DATE & TIME: 9/14/2021 11:45 PM    PCP: Drea Mckeon    ED PROVIDER: Rakan Cowart M.D.      Chief Complaint   Patient presents with     Urinary Frequency     Hematuria         FINAL IMPRESSION:  Hemorrhagic cystitis      ED COURSE & MEDICAL DECISION MAKING:    Pertinent Labs & Imaging studies reviewed. (See chart for details)  61 year old female presents to the Emergency Department for evaluation of bloody urine.  Patient reports she had a single episode earlier in the evening of her urine feeling \"hot\".  However the next time she urinated it was markedly bloody.  Patient has not had any obvious increased urination.  Patient without any easy bruising or bleeding with brushing of her teeth.  On exam she is a morbidly obese female in mild distress.  Vital signs are unremarkable.  Exam without any evidence of ecchymosis or petechiae.  Abdomen obese and nontender.  Urine grossly bloody.  Could be a very unusual presentation of hemorrhagic cystitis but seems remarkable without preceding symptoms.  Will review records to determine when last blood work was performed.  Patient appears non toxic with stable vitals signs. Overall exam is benign.          11:50 PM I met with the patient for the initial interview and physical examination. Discussed plan for treatment and workup in the ED.    11:58 PM.  Patient with last CBC in late 2020.  Patient with mild thrombocytopenia.  Will obtain laboratory evaluation to assess for potential bleeding diathesis.    12:46 AM.  Laboratory evaluation with mild thrombocytopenia once again.  INR is normal.  Remainder laboratory evaluation essentially normal.  Will continue outpatient management for hemorrhagic cystitis.  Patient to seek reevaluation within 1 to 2 days if not improving after initiating antibiotics.  At the conclusion of the encounter I " "discussed the results of all of the tests and the disposition. The questions were answered and return precautions provided. The patient or family acknowledged understanding and was agreeable with the care plan.   1:12 AM.  Prescription for Diflucan given as patient gets frequent yeast infections after antibiotics.    PPE: Provider wore gloves, N95 mask, eye protection.      MEDICATIONS GIVEN IN THE EMERGENCY:  Medications - No data to display    NEW PRESCRIPTIONS STARTED AT TODAY'S ER VISIT  Current Discharge Medication List      START taking these medications    Details   cephALEXin (KEFLEX) 500 MG capsule Take 1 capsule (500 mg) by mouth 4 times daily for 7 days  Qty: 28 capsule, Refills: 0                =================================================================    HPI    Patient information was obtained from: Patient    Use of Intrepreter: N/A       Lisandra Elias is a 61 year old female with a pertient medical history of HTN, DM2, who presents to the ED for evaluation of hematuria.     Patient reports a sudden onset of hematuria that began this evening. She notes one episode she said it felt \"hot,\" but otherwise no dysuria. She has never had any similar symptoms previously. Patient takes a low dose aspirin, no other blood thinners. She was at her PCP last week for an A1c and urinalysis. Denies urinary frequency, fever, chills, easy bleeding or bruising, or any additional symptoms at this time.     REVIEW OF SYSTEMS   Constitutional:  Denies fever, chills  Respiratory:  Denies productive cough or increased work of breathing  Cardiovascular:  Denies chest pain, palpitations  GI:  Denies abdominal pain, nausea, vomiting, or change in bowel   : Positive for hematuria. Negative for frequency, dysuria.  Musculoskeletal:  Denies any new muscle/joint swelling  Skin:  Denies rash   Neurologic:  Denies focal weakness  Hematologic: Negative for easy bleeding or bruising.  All systems negative except as marked. "     PAST MEDICAL HISTORY:  Past Medical History:   Diagnosis Date     Chronic pain      Diabetes mellitus (H)      History of anesthesia complications difficulty waking up     HTN (hypertension)      Migraine        PAST SURGICAL HISTORY:  Past Surgical History:   Procedure Laterality Date     APPENDECTOMY       HYSTERECTOMY N/A 2000's     OOPHORECTOMY Bilateral 2000's     [facility] EDG US EXAM SURGICAL ALTER STOM DUODENUM/JEJUNUM N/A 11/10/2014    Procedure: ESOPHAGOGASTRODUODENOSCOPY/ENDOSCOPIC ULTRASOUND;  Surgeon: Brian Lawrence MD;  Location: [facility];  Service: Gastroenterology     WISDOM TOOTH EXTRACTION N/A          CURRENT MEDICATIONS:    No current facility-administered medications for this encounter.    Current Outpatient Medications:      aspirin 81 MG EC tablet, [ASPIRIN 81 MG EC TABLET] Take 81 mg by mouth daily., Disp: , Rfl:      blood sugar diagnostic (GLUCOSE BLOOD) Strp, [BLOOD SUGAR DIAGNOSTIC (GLUCOSE BLOOD) STRP] OneTouch Ultra Blue In Vitro Strip     Test 3 times daily, Disp: , Rfl:      doxazosin (CARDURA) 8 MG tablet, [DOXAZOSIN (CARDURA) 8 MG TABLET] Take 8 mg by mouth daily., Disp: , Rfl:      ergocalciferol (VITAMIN D2) 50,000 unit capsule, [ERGOCALCIFEROL (VITAMIN D2) 50,000 UNIT CAPSULE] Take 50,000 Units by mouth once a week. 2,000 U, Disp: , Rfl:      fluticasone propionate (FLONASE) 50 mcg/actuation nasal spray, [FLUTICASONE PROPIONATE (FLONASE) 50 MCG/ACTUATION NASAL SPRAY] Apply 1 spray into each nostril daily., Disp: , Rfl:      glipiZIDE (GLUCOTROL XL) 10 MG 24 hr tablet, [GLIPIZIDE (GLUCOTROL XL) 10 MG 24 HR TABLET] Take 10 mg by mouth daily., Disp: , Rfl:      hydroCHLOROthiazide (HYDRODIURIL) 25 MG tablet, [HYDROCHLOROTHIAZIDE (HYDRODIURIL) 25 MG TABLET] Take 25 mg by mouth daily., Disp: , Rfl:      Lancets MISC, [LANCETS MISC] OneTouch Lancets Miscellaneous, Disp: , Rfl:      loperamide (IMODIUM A-D) 2 mg tablet, [LOPERAMIDE (IMODIUM A-D) 2 MG TABLET] Take 1 mg by mouth  daily., Disp: , Rfl:      losartan (COZAAR) 100 MG tablet, [LOSARTAN (COZAAR) 100 MG TABLET] Take 100 mg by mouth daily., Disp: , Rfl:      metFORMIN (GLUCOPHAGE-XR) 500 MG 24 hr tablet, [METFORMIN (GLUCOPHAGE-XR) 500 MG 24 HR TABLET] Take 1,000 mg by mouth daily., Disp: , Rfl:      metoprolol (TOPROL-XL) 50 MG 24 hr tablet, [METOPROLOL (TOPROL-XL) 50 MG 24 HR TABLET] Take 50 mg by mouth daily., Disp: , Rfl:      multivitamin therapeutic (THERAGRAN) tablet, [MULTIVITAMIN THERAPEUTIC (THERAGRAN) TABLET] Take 1 tablet by mouth daily., Disp: , Rfl:      pioglitazone (ACTOS) 15 MG tablet, [PIOGLITAZONE (ACTOS) 15 MG TABLET] Take 15 mg by mouth daily., Disp: , Rfl:     ALLERGIES:  Allergies   Allergen Reactions     Amlodipine Unknown     Amoxicillin-Pot Clavulanate [Augmentin] Unknown     Clotrimazole-Betamethasone [Lotrisone] Unknown     Codeine Unknown     Gemfibrozil Unknown     Ibuprofen Unknown     Lisinopril Unknown       FAMILY HISTORY:  Family History   Problem Relation Age of Onset     Heart Disease Father      Ovarian Cancer Mother 83.00       SOCIAL HISTORY:   Social History     Socioeconomic History     Marital status:      Spouse name: None     Number of children: None     Years of education: None     Highest education level: None   Occupational History     None   Tobacco Use     Smoking status: Former Smoker     Packs/day: 1.00     Types: Cigarettes     Start date: 1974     Quit date: 1979     Years since quittin.7     Smokeless tobacco: Never Used   Substance and Sexual Activity     Alcohol use: Yes     Alcohol/week: 1.0 standard drinks     Drug use: Never     Sexual activity: None   Other Topics Concern     None   Social History Narrative     None     Social Determinants of Health     Financial Resource Strain:      Difficulty of Paying Living Expenses:    Food Insecurity:      Worried About Running Out of Food in the Last Year:      Ran Out of Food in the Last Year:    Transportation  Needs:      Lack of Transportation (Medical):      Lack of Transportation (Non-Medical):    Physical Activity:      Days of Exercise per Week:      Minutes of Exercise per Session:    Stress:      Feeling of Stress :    Social Connections:      Frequency of Communication with Friends and Family:      Frequency of Social Gatherings with Friends and Family:      Attends Jainism Services:      Active Member of Clubs or Organizations:      Attends Club or Organization Meetings:      Marital Status:    Intimate Partner Violence:      Fear of Current or Ex-Partner:      Emotionally Abused:      Physically Abused:      Sexually Abused:        VITALS:  Patient Vitals for the past 24 hrs:   BP Temp Temp src Pulse Resp SpO2 Weight   09/15/21 0030 -- -- -- 75 -- 95 % --   09/14/21 2355 (!) 151/72 98.8  F (37.1  C) Oral 93 24 95 % (!) 151 kg (333 lb)        PHYSICAL EXAM    Constitutional: Obese female. Awake, alert, in no apparent distress  HENT:  Normocephalic, Atraumatic. Bilateral external ears normal. Oropharynx moist. Nose normal. Neck- Normal range of motion with no guarding, No midline cervical tenderness, Supple, No stridor.   Eyes:  PERRL, EOMI with no signs of entrapment, Conjunctiva normal, No discharge.   Respiratory:  Normal breath sounds, No respiratory distress, No wheezing.    Cardiovascular:  Normal heart rate, Normal rhythm, No appreciable rubs or gallops.   GI:  Soft, No tenderness, No distension, No palpable masses  Musculoskeletal:  Intact distal pulses, No edema. Good range of motion in all major joints. No tenderness to palpation or major deformities noted.  Integument:  Warm, Dry, No erythema, No rash.   Neurologic:  Alert & oriented, Normal motor function, Normal sensory function, No focal deficits noted.   Psychiatric:  Affect normal, Judgment normal, Mood normal.     LAB:  All pertinent labs reviewed and interpreted.  Results for orders placed or performed during the hospital encounter of 09/14/21    Comprehensive metabolic panel   Result Value Ref Range    Sodium 142 136 - 145 mmol/L    Potassium 3.7 3.5 - 5.0 mmol/L    Chloride 108 (H) 98 - 107 mmol/L    Carbon Dioxide (CO2) 25 22 - 31 mmol/L    Anion Gap 9 5 - 18 mmol/L    Urea Nitrogen 16 8 - 22 mg/dL    Creatinine 1.13 (H) 0.60 - 1.10 mg/dL    Calcium 10.2 8.5 - 10.5 mg/dL    Glucose 158 (H) 70 - 125 mg/dL    Alkaline Phosphatase 62 45 - 120 U/L    AST 29 0 - 40 U/L    ALT 22 0 - 45 U/L    Protein Total 6.8 6.0 - 8.0 g/dL    Albumin 3.3 (L) 3.5 - 5.0 g/dL    Bilirubin Total 0.5 0.0 - 1.0 mg/dL    GFR Estimate 52 (L) >60 mL/min/1.73m2   Result Value Ref Range    INR 1.10 0.85 - 1.15   CBC with platelets and differential   Result Value Ref Range    WBC Count 8.2 4.0 - 11.0 10e3/uL    RBC Count 4.85 3.80 - 5.20 10e6/uL    Hemoglobin 14.7 11.7 - 15.7 g/dL    Hematocrit 44.4 35.0 - 47.0 %    MCV 92 78 - 100 fL    MCH 30.3 26.5 - 33.0 pg    MCHC 33.1 31.5 - 36.5 g/dL    RDW 13.9 10.0 - 15.0 %    Platelet Count 118 (L) 150 - 450 10e3/uL    % Neutrophils 52 %    % Lymphocytes 31 %    % Monocytes 14 %    % Eosinophils 2 %    % Basophils 1 %    % Immature Granulocytes 0 %    NRBCs per 100 WBC 0 <1 /100    Absolute Neutrophils 4.3 1.6 - 8.3 10e3/uL    Absolute Lymphocytes 2.5 0.8 - 5.3 10e3/uL    Absolute Monocytes 1.2 0.0 - 1.3 10e3/uL    Absolute Eosinophils 0.2 0.0 - 0.7 10e3/uL    Absolute Basophils 0.1 0.0 - 0.2 10e3/uL    Absolute Immature Granulocytes 0.0 <=0.0 10e3/uL    Absolute NRBCs 0.0 10e3/uL       RADIOLOGY:  Reviewed all pertinent imaging. Please see official radiology report.  No orders to display           Rosalia DOUGLAS, am serving as a scribe to document services personally performed by Rakan Cowart MD, based on my observation and the provider's statements to me. I, Rakan Cowart MD attest that Rosalia Castellanos is acting in a scribe capacity, has observed my performance of the services and has documented them in accordance with my  direction.    Rakan Cowart M.D.  Emergency Medicine  [facility] EMERGENCY ROOM     Rakan Cowart MD  09/15/21 0048       Rakan Cowart MD  09/15/21 0103       Rakan Cowart MD  09/15/21 0112

## 2021-09-17 LAB — BACTERIA UR CULT: ABNORMAL

## 2021-10-27 ENCOUNTER — HOSPITAL ENCOUNTER (OUTPATIENT)
Dept: CT IMAGING | Facility: CLINIC | Age: 61
Discharge: HOME | End: 2021-10-27
Attending: INTERNAL MEDICINE | Admitting: INTERNAL MEDICINE
Payer: COMMERCIAL

## 2021-10-27 DIAGNOSIS — R91.8 LUNG NODULES: ICD-10-CM

## 2021-10-27 PROCEDURE — 71250 CT THORAX DX C-: CPT

## 2021-12-11 ENCOUNTER — HEALTH MAINTENANCE LETTER (OUTPATIENT)
Age: 61
End: 2021-12-11

## 2022-01-01 ENCOUNTER — APPOINTMENT (OUTPATIENT)
Dept: RADIOLOGY | Facility: CLINIC | Age: 62
End: 2022-01-01
Attending: EMERGENCY MEDICINE
Payer: COMMERCIAL

## 2022-01-01 ENCOUNTER — HOSPITAL ENCOUNTER (EMERGENCY)
Facility: CLINIC | Age: 62
Discharge: HOME | End: 2022-12-13
Attending: EMERGENCY MEDICINE | Admitting: EMERGENCY MEDICINE
Payer: COMMERCIAL

## 2022-01-01 VITALS
OXYGEN SATURATION: 96 % | TEMPERATURE: 98.6 F | HEART RATE: 65 BPM | WEIGHT: 293 LBS | BODY MASS INDEX: 58.36 KG/M2 | RESPIRATION RATE: 20 BRPM | SYSTOLIC BLOOD PRESSURE: 139 MMHG | DIASTOLIC BLOOD PRESSURE: 65 MMHG

## 2022-01-01 DIAGNOSIS — J21.0 RSV BRONCHIOLITIS: ICD-10-CM

## 2022-01-01 LAB
FLUAV RNA SPEC QL NAA+PROBE: NEGATIVE
FLUBV RNA RESP QL NAA+PROBE: NEGATIVE
RSV RNA SPEC NAA+PROBE: POSITIVE
SARS-COV-2 RNA RESP QL NAA+PROBE: NEGATIVE

## 2022-01-01 PROCEDURE — 94640 AIRWAY INHALATION TREATMENT: CPT

## 2022-01-01 PROCEDURE — 999N000157 HC STATISTIC RCP TIME EA 10 MIN

## 2022-01-01 PROCEDURE — 250N000009 HC RX 250: Performed by: EMERGENCY MEDICINE

## 2022-01-01 PROCEDURE — 99284 EMERGENCY DEPT VISIT MOD MDM: CPT | Mod: CS,25

## 2022-01-01 PROCEDURE — C9803 HOPD COVID-19 SPEC COLLECT: HCPCS

## 2022-01-01 PROCEDURE — 87637 SARSCOV2&INF A&B&RSV AMP PRB: CPT | Performed by: EMERGENCY MEDICINE

## 2022-01-01 PROCEDURE — 250N000013 HC RX MED GY IP 250 OP 250 PS 637: Performed by: EMERGENCY MEDICINE

## 2022-01-01 PROCEDURE — 71046 X-RAY EXAM CHEST 2 VIEWS: CPT

## 2022-01-01 RX ORDER — ACETAMINOPHEN 325 MG/1
650 TABLET ORAL ONCE
Status: COMPLETED | OUTPATIENT
Start: 2022-01-01 | End: 2022-01-01

## 2022-01-01 RX ORDER — IPRATROPIUM BROMIDE AND ALBUTEROL SULFATE 2.5; .5 MG/3ML; MG/3ML
3 SOLUTION RESPIRATORY (INHALATION) ONCE
Status: COMPLETED | OUTPATIENT
Start: 2022-01-01 | End: 2022-01-01

## 2022-01-01 RX ADMIN — IPRATROPIUM BROMIDE AND ALBUTEROL SULFATE 3 ML: 2.5; .5 SOLUTION RESPIRATORY (INHALATION) at 13:45

## 2022-01-01 RX ADMIN — ACETAMINOPHEN 650 MG: 325 TABLET ORAL at 14:24

## 2022-01-01 ASSESSMENT — ACTIVITIES OF DAILY LIVING (ADL): ADLS_ACUITY_SCORE: 35

## 2022-07-17 ENCOUNTER — HEALTH MAINTENANCE LETTER (OUTPATIENT)
Age: 62
End: 2022-07-17

## 2022-09-06 ENCOUNTER — HOSPITAL ENCOUNTER (OUTPATIENT)
Dept: MAMMOGRAPHY | Facility: CLINIC | Age: 62
Discharge: HOME | End: 2022-09-06
Attending: INTERNAL MEDICINE | Admitting: INTERNAL MEDICINE
Payer: COMMERCIAL

## 2022-09-06 DIAGNOSIS — Z12.31 SCREENING MAMMOGRAM, ENCOUNTER FOR: ICD-10-CM

## 2022-09-06 PROCEDURE — 77067 SCR MAMMO BI INCL CAD: CPT

## 2022-09-25 ENCOUNTER — HEALTH MAINTENANCE LETTER (OUTPATIENT)
Age: 62
End: 2022-09-25

## 2022-12-13 NOTE — ED TRIAGE NOTES
Pt has had shortness of breath since Thursday and she went to  on saturday and dx'd with sinus infection.  She took home COVID test Friday that was negative.  Sent home with Calli.  She continues to have shortness of breath and uses inhaler for relief.  States when she lays down she immediately fills up and coughs.       Triage Assessment     Row Name 12/13/22 9268       Triage Assessment (Adult)    Airway WDL WDL       Respiratory WDL    Respiratory WDL X       Skin Circulation/Temperature WDL    Skin Circulation/Temperature WDL WDL       Cardiac WDL    Cardiac WDL WDL       Peripheral/Neurovascular WDL    Peripheral Neurovascular WDL WDL       Cognitive/Neuro/Behavioral WDL    Cognitive/Neuro/Behavioral WDL WDL

## 2022-12-13 NOTE — ED PROVIDER NOTES
EMERGENCY DEPARTMENT ENCOUNTER            IMPRESSION:  RSV bronchiolitis      MEDICAL DECISION MAKING:  Patient evaluated for symptoms of respiratory infection.    On exam her vital signs are normal.  She appears winded.  She has soft expiratory wheeze    Respiratory viral panel showed RSV    Chest x-ray did not show pneumonia    Patient received nebulizer treatment; she had some improvement    Patient cleared for discharge home.  She has a MDI to use as needed          =================================================================  CHIEF COMPLAINT:  Chief Complaint   Patient presents with     Shortness of Breath         HPI  Lisandra Elais is a 62 year old female with a history of HTN and DM2 who presents to the ED by private vehicle for evaluation of shortness of breath.    The patient reports she has had cough, congestion, and ear pain since 12/8/22. She was seen at urgent care on 12/10/22 and was diagnosed with an ear infection. She was prescribed doxycycline for her ear infection and has been taking the medication as prescribed.    Since leaving urgent care she has had ongoing cough, congestion, and ear pain. Today she had increased wheezing and shortness of breath. She called her PCP and was referred to the ED for evaluation.    I, Nikhil Mcgill am serving as a scribe to document services personally performed by Dr. Blake Benson MD, based on my observation and the provider's statements to me. I, Dr. Blake Benson MD attest that Nikhil Mcgill is acting in a scribe capacity, has observed my performance of the services and has documented them in accordance with my direction.      REVIEW OF SYSTEMS   Constitutional: Denies fever, chills, unintentional weight loss or fatigue   Eyes: Denies visual changes or discharge    HENT: Denies sore throat, neck pain. Endorses congestion, ear pain.  Respiratory: Endorses cough, wheezing, shortness of breath.  Cardiovascular: Denies chest pain, palpitations or leg  swelling  GI: Denies abdominal pain, nausea, vomiting, or dark, bloody stools.    : Denies hematuria, dysuria, or flank pain  Musculoskeletal: Denies any new back pain or new muscle/joint pains  Skin: Denies rash or wound  Neurologic: Denies current headache, new weakness, focal weakness, or sensory changes    Lymphatic: Denies swollen glands    Psychiatric: Denies depression, suicidal ideation or homicidal ideation.    Remainder of systems reviewed, unless noted in HPI all others negative.      PAST MEDICAL HISTORY:  Past Medical History:   Diagnosis Date     Chronic pain      Diabetes mellitus (H)      History of anesthesia complications difficulty waking up     HTN (hypertension)      Migraine        PAST SURGICAL HISTORY:  Past Surgical History:   Procedure Laterality Date     APPENDECTOMY       HYSTERECTOMY N/A 2000's     OOPHORECTOMY Bilateral 2000's     [facility] EDG US EXAM SURGICAL ALTER STOM DUODENUM/JEJUNUM N/A 11/10/2014    Procedure: ESOPHAGOGASTRODUODENOSCOPY/ENDOSCOPIC ULTRASOUND;  Surgeon: Brian Lawrence MD;  Location: [facility];  Service: Gastroenterology     WISDOM TOOTH EXTRACTION N/A          CURRENT MEDICATIONS:    aspirin 81 MG EC tablet  blood sugar diagnostic (GLUCOSE BLOOD) Strp  doxazosin (CARDURA) 8 MG tablet  ergocalciferol (VITAMIN D2) 50,000 unit capsule  fluticasone propionate (FLONASE) 50 mcg/actuation nasal spray  glipiZIDE (GLUCOTROL XL) 10 MG 24 hr tablet  hydroCHLOROthiazide (HYDRODIURIL) 25 MG tablet  Lancets MISC  loperamide (IMODIUM A-D) 2 mg tablet  losartan (COZAAR) 100 MG tablet  metFORMIN (GLUCOPHAGE-XR) 500 MG 24 hr tablet  metoprolol (TOPROL-XL) 50 MG 24 hr tablet  multivitamin therapeutic (THERAGRAN) tablet  pioglitazone (ACTOS) 15 MG tablet        ALLERGIES:  Allergies   Allergen Reactions     Amlodipine Unknown     Amoxicillin-Pot Clavulanate [Augmentin] Unknown     Clotrimazole-Betamethasone [Lotrisone] Unknown     Codeine Unknown     Gemfibrozil Unknown      Ibuprofen Unknown     Lisinopril Unknown       FAMILY HISTORY:  Family History   Problem Relation Age of Onset     Heart Disease Father      Ovarian Cancer Mother 83.00       SOCIAL HISTORY:   Social History     Socioeconomic History     Marital status:    Tobacco Use     Smoking status: Former     Packs/day: 1.00     Types: Cigarettes     Start date: 1974     Quit date: 1979     Years since quittin.9     Smokeless tobacco: Never   Substance and Sexual Activity     Alcohol use: Yes     Alcohol/week: 1.0 standard drink     Drug use: Never       PHYSICAL EXAM:    /84   Pulse 81   Temp 98.6  F (37  C) (Oral)   Resp 16   Wt (!) 154.2 kg (340 lb)   SpO2 98%   BMI 58.36 kg/m      Constitutional: Awake, alert, in no acute distress  Head: Normocephalic, atraumatic.  ENT: Mucous membranes moist. Posterior oropharynx appears normal.  Eyes: Pupils midrange and reactive ,no conjunctival discharge  Neck: No lymphadenopathy, no stridor, supple, no soft tissue swelling  Chest: No tenderness   Respiratory: Soft expiratory wheeze  Cardiovascular: Regular rate and rhythm.+2 radial pulses, equal bilaterally.  No murmurs.   GI: Abdomen soft, non-tender to palpation in all 4 quadrants. No guarding or rebound. Bowel sounds intact on all 4 quadrants.   Back: No CVA tenderness.    Musculoskeletal: Moves all 4 extremities equally, strength symmetrical on bilateral uppers and lowers.  No peripheral edema  Integument: Warm, dry. No rash. No bruising or petechiae.  Lymphatic: No cervical lymphadenopathy  Neurologic: Alert & oriented x 3. Normal speech. Grossly normal motor and sensory function. No focal deficits noted.  NIHSS = 0  Psychiatric: Normal mood and affect. Normal judgement.    ED COURSE:    1:25 PM I introduced myself to the patient, obtained patient history, performed a physical exam, and discussed plan for ED workup including potential diagnostic laboratory/imaging studies and interventions.  2:37  PM Rechecked and updated the patient. We discussed the plan for discharge and the patient is agreeable. Reviewed supportive cares, symptomatic treatment, outpatient follow up, and reasons to return to the Emergency Department. Patient to be discharged by ED RN.     LAB:  All pertinent labs reviewed and interpreted.  Results for orders placed or performed during the hospital encounter of 12/13/22   Chest XR,  PA & LAT    Impression    IMPRESSION: The heart is normal in size. Hazy density overlying the left lower heart border which is likely due to prominent cardiophrenic fat. The lungs otherwise appear clear. No consolidation or pleural effusion. No significant bone abnormalities.   Symptomatic Influenza A/B & SARS-CoV2 (COVID-19) Virus PCR Multiplex Nose    Specimen: Nose; Swab   Result Value Ref Range    Influenza A PCR Negative Negative    Influenza B PCR Negative Negative    RSV PCR Positive (A) Negative    SARS CoV2 PCR Negative Negative       RADIOLOGY:  Reviewed all pertinent imaging. Please see official radiology report.  Chest XR,  PA & LAT   Final Result   IMPRESSION: The heart is normal in size. Hazy density overlying the left lower heart border which is likely due to prominent cardiophrenic fat. The lungs otherwise appear clear. No consolidation or pleural effusion. No significant bone abnormalities.           MEDICATIONS GIVEN IN THE EMERGENCY:  Medications   ipratropium - albuterol 0.5 mg/2.5 mg/3 mL (DUONEB) neb solution 3 mL (3 mLs Nebulization Given 12/13/22 1345)   acetaminophen (TYLENOL) tablet 650 mg (650 mg Oral Given 12/13/22 1424)           NEW PRESCRIPTIONS STARTED AT TODAY'S ER VISIT:  New Prescriptions    No medications on file          FINAL DIAGNOSIS:    ICD-10-CM    1. RSV bronchiolitis  J21.0                At the conclusion of the encounter I discussed the results of all of the tests and the disposition. The questions were answered. The patient or family acknowledged understanding and  was agreeable with the care plan.     NAME: Lisandra Elias  AGE: 62 year old female  YOB: 1960  MRN: [id number]  EVALUATION DATE & TIME: No admission date for patient encounter.    PCP: Drea Mckeon    ED PROVIDER: Blake Benson M.D.      Nikhil DOUGLAS, am serving as a scribe to document services personally performed by Dr. Blake Benson based on my observation and the provider's statements to me. Blake DOUGLAS MD attest that Nikhil Mcgill is acting in a scribe capacity, has observed my performance of the services and has documented them in accordance with my direction.    Blake Benson M.D.  Emergency Medicine  [facility] EMERGENCY ROOM  Critical access hospital[address] [facility] [id number]  [phone]  Dept: [phone]  12/13/2022         Blake Benson MD  12/13/22 4316

## 2022-12-13 NOTE — DISCHARGE INSTRUCTIONS
You have a viral respiratory infection with RSV  Use your inhaler as needed  If you become significantly worse you may return to the emergency department

## 2023-01-01 ENCOUNTER — HOSPITAL ENCOUNTER (EMERGENCY)
Facility: CLINIC | Age: 63
Discharge: HOME | End: 2023-11-13
Attending: EMERGENCY MEDICINE | Admitting: EMERGENCY MEDICINE
Payer: COMMERCIAL

## 2023-01-01 ENCOUNTER — APPOINTMENT (OUTPATIENT)
Dept: RADIOLOGY | Facility: CLINIC | Age: 63
End: 2023-01-01
Attending: EMERGENCY MEDICINE
Payer: COMMERCIAL

## 2023-01-01 ENCOUNTER — ANCILLARY PROCEDURE (OUTPATIENT)
Dept: GENERAL RADIOLOGY | Facility: CLINIC | Age: 63
End: 2023-01-01
Attending: STUDENT IN AN ORGANIZED HEALTH CARE EDUCATION/TRAINING PROGRAM
Payer: COMMERCIAL

## 2023-01-01 ENCOUNTER — HOSPITAL ENCOUNTER (EMERGENCY)
Facility: CLINIC | Age: 63
Discharge: HOME | End: 2023-11-07
Attending: EMERGENCY MEDICINE | Admitting: EMERGENCY MEDICINE
Payer: COMMERCIAL

## 2023-01-01 ENCOUNTER — ANESTHESIA (OUTPATIENT)
Dept: SURGERY | Facility: CLINIC | Age: 63
End: 2023-01-01
Payer: COMMERCIAL

## 2023-01-01 ENCOUNTER — TELEPHONE (OUTPATIENT)
Dept: ORTHOPEDICS | Facility: CLINIC | Age: 63
End: 2023-01-01
Payer: COMMERCIAL

## 2023-01-01 ENCOUNTER — HOSPITAL ENCOUNTER (OUTPATIENT)
Facility: CLINIC | Age: 63
Discharge: HOME | End: 2023-05-25
Attending: INTERNAL MEDICINE | Admitting: INTERNAL MEDICINE
Payer: COMMERCIAL

## 2023-01-01 ENCOUNTER — HOSPITAL ENCOUNTER (EMERGENCY)
Facility: CLINIC | Age: 63
Discharge: HOME | End: 2023-04-24
Attending: EMERGENCY MEDICINE | Admitting: EMERGENCY MEDICINE
Payer: COMMERCIAL

## 2023-01-01 ENCOUNTER — OFFICE VISIT (OUTPATIENT)
Dept: ORTHOPEDICS | Facility: CLINIC | Age: 63
End: 2023-01-01
Attending: EMERGENCY MEDICINE
Payer: COMMERCIAL

## 2023-01-01 ENCOUNTER — HEALTH MAINTENANCE LETTER (OUTPATIENT)
Age: 63
End: 2023-01-01

## 2023-01-01 ENCOUNTER — ANESTHESIA EVENT (OUTPATIENT)
Dept: SURGERY | Facility: CLINIC | Age: 63
End: 2023-01-01
Payer: COMMERCIAL

## 2023-01-01 ENCOUNTER — OFFICE VISIT (OUTPATIENT)
Dept: ORTHOPEDICS | Facility: CLINIC | Age: 63
End: 2023-01-01
Payer: COMMERCIAL

## 2023-01-01 ENCOUNTER — OFFICE VISIT (OUTPATIENT)
Dept: FAMILY MEDICINE | Facility: CLINIC | Age: 63
End: 2023-01-01
Payer: COMMERCIAL

## 2023-01-01 ENCOUNTER — HOSPITAL ENCOUNTER (OUTPATIENT)
Dept: MAMMOGRAPHY | Facility: CLINIC | Age: 63
Discharge: HOME | End: 2023-09-13
Attending: INTERNAL MEDICINE | Admitting: INTERNAL MEDICINE
Payer: COMMERCIAL

## 2023-01-01 ENCOUNTER — HOSPITAL ENCOUNTER (EMERGENCY)
Facility: CLINIC | Age: 63
Discharge: HOME | End: 2023-09-20
Attending: EMERGENCY MEDICINE | Admitting: EMERGENCY MEDICINE
Payer: COMMERCIAL

## 2023-01-01 ENCOUNTER — NURSE TRIAGE (OUTPATIENT)
Dept: NURSING | Facility: CLINIC | Age: 63
End: 2023-01-01
Payer: COMMERCIAL

## 2023-01-01 ENCOUNTER — DOCUMENTATION ONLY (OUTPATIENT)
Dept: OTHER | Facility: CLINIC | Age: 63
End: 2023-01-01
Payer: COMMERCIAL

## 2023-01-01 VITALS
HEART RATE: 64 BPM | OXYGEN SATURATION: 92 % | SYSTOLIC BLOOD PRESSURE: 116 MMHG | HEIGHT: 64 IN | WEIGHT: 293 LBS | RESPIRATION RATE: 19 BRPM | DIASTOLIC BLOOD PRESSURE: 56 MMHG | BODY MASS INDEX: 50.02 KG/M2 | TEMPERATURE: 98 F

## 2023-01-01 VITALS
TEMPERATURE: 97.7 F | RESPIRATION RATE: 23 BRPM | HEART RATE: 62 BPM | OXYGEN SATURATION: 94 % | DIASTOLIC BLOOD PRESSURE: 61 MMHG | HEIGHT: 64 IN | BODY MASS INDEX: 50.02 KG/M2 | SYSTOLIC BLOOD PRESSURE: 128 MMHG | WEIGHT: 293 LBS

## 2023-01-01 VITALS
OXYGEN SATURATION: 92 % | WEIGHT: 293 LBS | DIASTOLIC BLOOD PRESSURE: 67 MMHG | RESPIRATION RATE: 22 BRPM | SYSTOLIC BLOOD PRESSURE: 153 MMHG | HEART RATE: 70 BPM | BODY MASS INDEX: 56.64 KG/M2

## 2023-01-01 VITALS
RESPIRATION RATE: 16 BRPM | BODY MASS INDEX: 59.22 KG/M2 | HEART RATE: 69 BPM | OXYGEN SATURATION: 94 % | WEIGHT: 293 LBS | TEMPERATURE: 97.9 F | DIASTOLIC BLOOD PRESSURE: 77 MMHG | SYSTOLIC BLOOD PRESSURE: 132 MMHG

## 2023-01-01 VITALS
WEIGHT: 293 LBS | BODY MASS INDEX: 50.02 KG/M2 | RESPIRATION RATE: 22 BRPM | TEMPERATURE: 98.3 F | SYSTOLIC BLOOD PRESSURE: 131 MMHG | HEIGHT: 64 IN | OXYGEN SATURATION: 93 % | HEART RATE: 100 BPM | DIASTOLIC BLOOD PRESSURE: 78 MMHG

## 2023-01-01 VITALS
DIASTOLIC BLOOD PRESSURE: 56 MMHG | HEART RATE: 64 BPM | BODY MASS INDEX: 50.02 KG/M2 | RESPIRATION RATE: 18 BRPM | OXYGEN SATURATION: 95 % | HEIGHT: 64 IN | TEMPERATURE: 98.4 F | WEIGHT: 293 LBS | SYSTOLIC BLOOD PRESSURE: 115 MMHG

## 2023-01-01 DIAGNOSIS — Z12.31 VISIT FOR SCREENING MAMMOGRAM: ICD-10-CM

## 2023-01-01 DIAGNOSIS — H65.191 ACUTE MUCOID OTITIS MEDIA OF RIGHT EAR: ICD-10-CM

## 2023-01-01 DIAGNOSIS — R04.0 EPISTAXIS: ICD-10-CM

## 2023-01-01 DIAGNOSIS — R60.9 EDEMA, UNSPECIFIED TYPE: ICD-10-CM

## 2023-01-01 DIAGNOSIS — S42.215A CLOSED NONDISPLACED FRACTURE OF SURGICAL NECK OF LEFT HUMERUS, UNSPECIFIED FRACTURE MORPHOLOGY, INITIAL ENCOUNTER: ICD-10-CM

## 2023-01-01 DIAGNOSIS — S42.255A CLOSED NONDISPLACED FRACTURE OF GREATER TUBEROSITY OF LEFT HUMERUS, INITIAL ENCOUNTER: ICD-10-CM

## 2023-01-01 DIAGNOSIS — S42.294D OTHER CLOSED NONDISPLACED FRACTURE OF PROXIMAL END OF RIGHT HUMERUS WITH ROUTINE HEALING, SUBSEQUENT ENCOUNTER: Primary | ICD-10-CM

## 2023-01-01 DIAGNOSIS — J34.89 SINUS PRESSURE: Primary | ICD-10-CM

## 2023-01-01 DIAGNOSIS — S42.215A CLOSED NONDISPLACED FRACTURE OF SURGICAL NECK OF LEFT HUMERUS, UNSPECIFIED FRACTURE MORPHOLOGY, INITIAL ENCOUNTER: Primary | ICD-10-CM

## 2023-01-01 LAB
ALBUMIN SERPL-MCNC: 3 G/DL (ref 3.5–5)
ALP SERPL-CCNC: 68 U/L (ref 45–120)
ALT SERPL W P-5'-P-CCNC: 18 U/L (ref 0–45)
ANION GAP SERPL CALCULATED.3IONS-SCNC: 12 MMOL/L (ref 5–18)
AST SERPL W P-5'-P-CCNC: 25 U/L (ref 0–40)
ATRIAL RATE - MUSE: 60 BPM
BASOPHILS # BLD AUTO: 0.1 10E3/UL (ref 0–0.2)
BASOPHILS NFR BLD AUTO: 1 %
BILIRUB SERPL-MCNC: 0.7 MG/DL (ref 0–1)
BNP SERPL-MCNC: 20 PG/ML (ref 0–103)
BUN SERPL-MCNC: 19 MG/DL (ref 8–22)
CALCIUM SERPL-MCNC: 9.9 MG/DL (ref 8.5–10.5)
CHLORIDE BLD-SCNC: 105 MMOL/L (ref 98–107)
CO2 SERPL-SCNC: 23 MMOL/L (ref 22–31)
COLONOSCOPY: NORMAL
CREAT SERPL-MCNC: 1.14 MG/DL (ref 0.6–1.1)
D DIMER PPP FEU-MCNC: 0.58 UG/ML FEU (ref 0–0.5)
DIASTOLIC BLOOD PRESSURE - MUSE: 64 MMHG
EOSINOPHIL # BLD AUTO: 0.2 10E3/UL (ref 0–0.7)
EOSINOPHIL NFR BLD AUTO: 3 %
ERYTHROCYTE [DISTWIDTH] IN BLOOD BY AUTOMATED COUNT: 13.2 % (ref 10–15)
GFR SERPL CREATININE-BSD FRML MDRD: 53 ML/MIN/1.73M2
GLUCOSE BLD-MCNC: 204 MG/DL (ref 70–125)
GLUCOSE BLDC GLUCOMTR-MCNC: 224 MG/DL (ref 70–99)
HCT VFR BLD AUTO: 41.5 % (ref 35–47)
HGB BLD-MCNC: 13.9 G/DL (ref 11.7–15.7)
IMM GRANULOCYTES # BLD: 0 10E3/UL
IMM GRANULOCYTES NFR BLD: 0 %
INTERPRETATION ECG - MUSE: NORMAL
LYMPHOCYTES # BLD AUTO: 1.7 10E3/UL (ref 0.8–5.3)
LYMPHOCYTES NFR BLD AUTO: 28 %
MCH RBC QN AUTO: 29.5 PG (ref 26.5–33)
MCHC RBC AUTO-ENTMCNC: 33.5 G/DL (ref 31.5–36.5)
MCV RBC AUTO: 88 FL (ref 78–100)
MONOCYTES # BLD AUTO: 0.6 10E3/UL (ref 0–1.3)
MONOCYTES NFR BLD AUTO: 10 %
NEUTROPHILS # BLD AUTO: 3.7 10E3/UL (ref 1.6–8.3)
NEUTROPHILS NFR BLD AUTO: 58 %
NRBC # BLD AUTO: 0 10E3/UL
NRBC BLD AUTO-RTO: 0 /100
P AXIS - MUSE: 49 DEGREES
PATH REPORT.COMMENTS IMP SPEC: NORMAL
PATH REPORT.COMMENTS IMP SPEC: NORMAL
PATH REPORT.FINAL DX SPEC: NORMAL
PATH REPORT.GROSS SPEC: NORMAL
PATH REPORT.MICROSCOPIC SPEC OTHER STN: NORMAL
PATH REPORT.RELEVANT HX SPEC: NORMAL
PHOTO IMAGE: NORMAL
PLATELET # BLD AUTO: 106 10E3/UL (ref 150–450)
POTASSIUM BLD-SCNC: 3.7 MMOL/L (ref 3.5–5)
PR INTERVAL - MUSE: 198 MS
PROT SERPL-MCNC: 6.5 G/DL (ref 6–8)
QRS DURATION - MUSE: 138 MS
QT - MUSE: 452 MS
QTC - MUSE: 452 MS
R AXIS - MUSE: 61 DEGREES
RBC # BLD AUTO: 4.71 10E6/UL (ref 3.8–5.2)
SODIUM SERPL-SCNC: 140 MMOL/L (ref 136–145)
SYSTOLIC BLOOD PRESSURE - MUSE: 148 MMHG
T AXIS - MUSE: 0 DEGREES
TROPONIN I SERPL-MCNC: <0.01 NG/ML (ref 0–0.29)
VENTRICULAR RATE- MUSE: 60 BPM
WBC # BLD AUTO: 6.3 10E3/UL (ref 4–11)

## 2023-01-01 PROCEDURE — 73030 X-RAY EXAM OF SHOULDER: CPT | Mod: TC | Performed by: RADIOLOGY

## 2023-01-01 PROCEDURE — 82962 GLUCOSE BLOOD TEST: CPT

## 2023-01-01 PROCEDURE — 99284 EMERGENCY DEPT VISIT MOD MDM: CPT | Mod: 25

## 2023-01-01 PROCEDURE — 272N000001 HC OR GENERAL SUPPLY STERILE: Performed by: INTERNAL MEDICINE

## 2023-01-01 PROCEDURE — 99285 EMERGENCY DEPT VISIT HI MDM: CPT | Mod: 25

## 2023-01-01 PROCEDURE — 99203 OFFICE O/P NEW LOW 30 MIN: CPT | Performed by: PHYSICIAN ASSISTANT

## 2023-01-01 PROCEDURE — 73030 X-RAY EXAM OF SHOULDER: CPT | Mod: RT

## 2023-01-01 PROCEDURE — 96372 THER/PROPH/DIAG INJ SC/IM: CPT | Performed by: EMERGENCY MEDICINE

## 2023-01-01 PROCEDURE — 84484 ASSAY OF TROPONIN QUANT: CPT | Performed by: EMERGENCY MEDICINE

## 2023-01-01 PROCEDURE — 258N000003 HC RX IP 258 OP 636: Performed by: ANESTHESIOLOGY

## 2023-01-01 PROCEDURE — 83880 ASSAY OF NATRIURETIC PEPTIDE: CPT | Performed by: EMERGENCY MEDICINE

## 2023-01-01 PROCEDURE — 93005 ELECTROCARDIOGRAM TRACING: CPT | Performed by: EMERGENCY MEDICINE

## 2023-01-01 PROCEDURE — 36415 COLL VENOUS BLD VENIPUNCTURE: CPT | Performed by: EMERGENCY MEDICINE

## 2023-01-01 PROCEDURE — 250N000009 HC RX 250: Performed by: NURSE ANESTHETIST, CERTIFIED REGISTERED

## 2023-01-01 PROCEDURE — 99213 OFFICE O/P EST LOW 20 MIN: CPT | Performed by: STUDENT IN AN ORGANIZED HEALTH CARE EDUCATION/TRAINING PROGRAM

## 2023-01-01 PROCEDURE — 999N000141 HC STATISTIC PRE-PROCEDURE NURSING ASSESSMENT: Performed by: INTERNAL MEDICINE

## 2023-01-01 PROCEDURE — 77067 SCR MAMMO BI INCL CAD: CPT

## 2023-01-01 PROCEDURE — 96375 TX/PRO/DX INJ NEW DRUG ADDON: CPT | Mod: 59

## 2023-01-01 PROCEDURE — 360N000075 HC SURGERY LEVEL 2, PER MIN: Performed by: INTERNAL MEDICINE

## 2023-01-01 PROCEDURE — 250N000013 HC RX MED GY IP 250 OP 250 PS 637: Performed by: EMERGENCY MEDICINE

## 2023-01-01 PROCEDURE — 23620 CLTX GR HMRL TBRS FX WO MNPJ: CPT | Mod: RT

## 2023-01-01 PROCEDURE — 250N000011 HC RX IP 250 OP 636: Performed by: NURSE ANESTHETIST, CERTIFIED REGISTERED

## 2023-01-01 PROCEDURE — 85025 COMPLETE CBC W/AUTO DIFF WBC: CPT | Performed by: EMERGENCY MEDICINE

## 2023-01-01 PROCEDURE — 88305 TISSUE EXAM BY PATHOLOGIST: CPT | Mod: 26 | Performed by: PATHOLOGY

## 2023-01-01 PROCEDURE — 30903 CONTROL OF NOSEBLEED: CPT | Mod: RT

## 2023-01-01 PROCEDURE — 250N000011 HC RX IP 250 OP 636: Mod: JZ | Performed by: EMERGENCY MEDICINE

## 2023-01-01 PROCEDURE — 85379 FIBRIN DEGRADATION QUANT: CPT | Performed by: EMERGENCY MEDICINE

## 2023-01-01 PROCEDURE — 71046 X-RAY EXAM CHEST 2 VIEWS: CPT

## 2023-01-01 PROCEDURE — 710N000012 HC RECOVERY PHASE 2, PER MINUTE: Performed by: INTERNAL MEDICINE

## 2023-01-01 PROCEDURE — 99204 OFFICE O/P NEW MOD 45 MIN: CPT | Performed by: STUDENT IN AN ORGANIZED HEALTH CARE EDUCATION/TRAINING PROGRAM

## 2023-01-01 PROCEDURE — 250N000011 HC RX IP 250 OP 636: Performed by: EMERGENCY MEDICINE

## 2023-01-01 PROCEDURE — 80053 COMPREHEN METABOLIC PANEL: CPT | Performed by: EMERGENCY MEDICINE

## 2023-01-01 PROCEDURE — 96374 THER/PROPH/DIAG INJ IV PUSH: CPT | Mod: 59

## 2023-01-01 PROCEDURE — 88305 TISSUE EXAM BY PATHOLOGIST: CPT | Mod: TC | Performed by: INTERNAL MEDICINE

## 2023-01-01 PROCEDURE — 370N000017 HC ANESTHESIA TECHNICAL FEE, PER MIN: Performed by: INTERNAL MEDICINE

## 2023-01-01 RX ORDER — CYANOCOBALAMIN (VITAMIN B-12) 500 MCG
1 TABLET ORAL DAILY
COMMUNITY

## 2023-01-01 RX ORDER — OXYCODONE HYDROCHLORIDE 5 MG/1
5 TABLET ORAL EVERY 6 HOURS PRN
COMMUNITY

## 2023-01-01 RX ORDER — FENTANYL CITRATE 50 UG/ML
25 INJECTION, SOLUTION INTRAMUSCULAR; INTRAVENOUS EVERY 5 MIN PRN
Status: CANCELLED | OUTPATIENT
Start: 2023-01-01

## 2023-01-01 RX ORDER — LIDOCAINE HYDROCHLORIDE 10 MG/ML
INJECTION, SOLUTION INFILTRATION; PERINEURAL PRN
Status: DISCONTINUED | OUTPATIENT
Start: 2023-01-01 | End: 2023-01-01

## 2023-01-01 RX ORDER — NALOXONE HYDROCHLORIDE 0.4 MG/ML
0.4 INJECTION, SOLUTION INTRAMUSCULAR; INTRAVENOUS; SUBCUTANEOUS
Status: CANCELLED | OUTPATIENT
Start: 2023-01-01

## 2023-01-01 RX ORDER — SODIUM CHLORIDE, SODIUM LACTATE, POTASSIUM CHLORIDE, CALCIUM CHLORIDE 600; 310; 30; 20 MG/100ML; MG/100ML; MG/100ML; MG/100ML
INJECTION, SOLUTION INTRAVENOUS CONTINUOUS
Status: DISCONTINUED | OUTPATIENT
Start: 2023-01-01 | End: 2023-01-01 | Stop reason: HOSPADM

## 2023-01-01 RX ORDER — CETIRIZINE HYDROCHLORIDE 10 MG/1
10 TABLET ORAL DAILY
Qty: 30 TABLET | Refills: 3 | Status: SHIPPED | OUTPATIENT
Start: 2023-01-01

## 2023-01-01 RX ORDER — VITAMIN B COMPLEX
1 TABLET ORAL DAILY
COMMUNITY

## 2023-01-01 RX ORDER — PRAVASTATIN SODIUM 20 MG
20 TABLET ORAL DAILY
COMMUNITY

## 2023-01-01 RX ORDER — HYDROMORPHONE HYDROCHLORIDE 1 MG/ML
0.5 INJECTION, SOLUTION INTRAMUSCULAR; INTRAVENOUS; SUBCUTANEOUS ONCE
Status: COMPLETED | OUTPATIENT
Start: 2023-01-01 | End: 2023-01-01

## 2023-01-01 RX ORDER — TRAMADOL HYDROCHLORIDE 50 MG/1
50-100 TABLET ORAL EVERY 6 HOURS PRN
Qty: 18 TABLET | Refills: 0 | Status: SHIPPED | OUTPATIENT
Start: 2023-01-01 | End: 2023-01-01

## 2023-01-01 RX ORDER — ONDANSETRON 2 MG/ML
INJECTION INTRAMUSCULAR; INTRAVENOUS PRN
Status: DISCONTINUED | OUTPATIENT
Start: 2023-01-01 | End: 2023-01-01

## 2023-01-01 RX ORDER — FENTANYL CITRATE 50 UG/ML
50 INJECTION, SOLUTION INTRAMUSCULAR; INTRAVENOUS EVERY 5 MIN PRN
Status: CANCELLED | OUTPATIENT
Start: 2023-01-01

## 2023-01-01 RX ORDER — CEFDINIR 300 MG/1
300 CAPSULE ORAL 2 TIMES DAILY
Qty: 20 CAPSULE | Refills: 0 | Status: SHIPPED | OUTPATIENT
Start: 2023-01-01 | End: 2023-01-01

## 2023-01-01 RX ORDER — PROPOFOL 10 MG/ML
INJECTION, EMULSION INTRAVENOUS PRN
Status: DISCONTINUED | OUTPATIENT
Start: 2023-01-01 | End: 2023-01-01

## 2023-01-01 RX ORDER — NALOXONE HYDROCHLORIDE 0.4 MG/ML
0.2 INJECTION, SOLUTION INTRAMUSCULAR; INTRAVENOUS; SUBCUTANEOUS
Status: CANCELLED | OUTPATIENT
Start: 2023-01-01

## 2023-01-01 RX ORDER — KETOROLAC TROMETHAMINE 15 MG/ML
15 INJECTION, SOLUTION INTRAMUSCULAR; INTRAVENOUS ONCE
Status: COMPLETED | OUTPATIENT
Start: 2023-01-01 | End: 2023-01-01

## 2023-01-01 RX ORDER — LIDOCAINE 40 MG/G
CREAM TOPICAL
Status: DISCONTINUED | OUTPATIENT
Start: 2023-01-01 | End: 2023-01-01 | Stop reason: HOSPADM

## 2023-01-01 RX ORDER — SODIUM CHLORIDE, SODIUM LACTATE, POTASSIUM CHLORIDE, CALCIUM CHLORIDE 600; 310; 30; 20 MG/100ML; MG/100ML; MG/100ML; MG/100ML
INJECTION, SOLUTION INTRAVENOUS CONTINUOUS
Status: CANCELLED | OUTPATIENT
Start: 2023-01-01

## 2023-01-01 RX ORDER — DIPHENHYDRAMINE HCL 25 MG
25 CAPSULE ORAL EVERY 6 HOURS PRN
Status: CANCELLED | OUTPATIENT
Start: 2023-01-01

## 2023-01-01 RX ORDER — ONDANSETRON 4 MG/1
4 TABLET, ORALLY DISINTEGRATING ORAL EVERY 30 MIN PRN
Status: CANCELLED | OUTPATIENT
Start: 2023-01-01

## 2023-01-01 RX ORDER — DIPHENHYDRAMINE HYDROCHLORIDE 50 MG/ML
25 INJECTION INTRAMUSCULAR; INTRAVENOUS EVERY 6 HOURS PRN
Status: CANCELLED | OUTPATIENT
Start: 2023-01-01

## 2023-01-01 RX ORDER — FLUMAZENIL 0.1 MG/ML
0.2 INJECTION, SOLUTION INTRAVENOUS
Status: CANCELLED | OUTPATIENT
Start: 2023-01-01 | End: 2023-01-01

## 2023-01-01 RX ORDER — ONDANSETRON 4 MG/1
4 TABLET, ORALLY DISINTEGRATING ORAL EVERY 6 HOURS PRN
Status: CANCELLED | OUTPATIENT
Start: 2023-01-01

## 2023-01-01 RX ORDER — FLUCONAZOLE 200 MG/1
TABLET ORAL
Qty: 2 TABLET | Refills: 0 | Status: SHIPPED | OUTPATIENT
Start: 2023-01-01

## 2023-01-01 RX ORDER — DIAZEPAM 10 MG/2ML
2.5 INJECTION, SOLUTION INTRAMUSCULAR; INTRAVENOUS
Status: CANCELLED | OUTPATIENT
Start: 2023-01-01

## 2023-01-01 RX ORDER — HALOPERIDOL 5 MG/ML
1 INJECTION INTRAMUSCULAR
Status: CANCELLED | OUTPATIENT
Start: 2023-01-01

## 2023-01-01 RX ORDER — OXYCODONE HYDROCHLORIDE 5 MG/1
5 TABLET ORAL EVERY 6 HOURS PRN
Qty: 12 TABLET | Refills: 0 | Status: SHIPPED | OUTPATIENT
Start: 2023-01-01 | End: 2023-01-01

## 2023-01-01 RX ORDER — ONDANSETRON 2 MG/ML
4 INJECTION INTRAMUSCULAR; INTRAVENOUS EVERY 30 MIN PRN
Status: CANCELLED | OUTPATIENT
Start: 2023-01-01

## 2023-01-01 RX ORDER — ONDANSETRON 2 MG/ML
4 INJECTION INTRAMUSCULAR; INTRAVENOUS ONCE
Status: COMPLETED | OUTPATIENT
Start: 2023-01-01 | End: 2023-01-01

## 2023-01-01 RX ORDER — TRAMADOL HYDROCHLORIDE 50 MG/1
50 TABLET ORAL ONCE
Status: COMPLETED | OUTPATIENT
Start: 2023-01-01 | End: 2023-01-01

## 2023-01-01 RX ORDER — ONDANSETRON 2 MG/ML
4 INJECTION INTRAMUSCULAR; INTRAVENOUS EVERY 6 HOURS PRN
Status: CANCELLED | OUTPATIENT
Start: 2023-01-01

## 2023-01-01 RX ORDER — PROCHLORPERAZINE MALEATE 10 MG
10 TABLET ORAL EVERY 6 HOURS PRN
Status: CANCELLED | OUTPATIENT
Start: 2023-01-01

## 2023-01-01 RX ADMIN — PROPOFOL 20 MG: 10 INJECTION, EMULSION INTRAVENOUS at 09:22

## 2023-01-01 RX ADMIN — PROPOFOL 20 MG: 10 INJECTION, EMULSION INTRAVENOUS at 09:27

## 2023-01-01 RX ADMIN — PROPOFOL 20 MG: 10 INJECTION, EMULSION INTRAVENOUS at 09:35

## 2023-01-01 RX ADMIN — PROPOFOL 50 MG: 10 INJECTION, EMULSION INTRAVENOUS at 09:16

## 2023-01-01 RX ADMIN — ONDANSETRON 4 MG: 2 INJECTION INTRAMUSCULAR; INTRAVENOUS at 13:34

## 2023-01-01 RX ADMIN — HYDROMORPHONE HYDROCHLORIDE 0.5 MG: 1 INJECTION, SOLUTION INTRAMUSCULAR; INTRAVENOUS; SUBCUTANEOUS at 13:35

## 2023-01-01 RX ADMIN — PROPOFOL 20 MG: 10 INJECTION, EMULSION INTRAVENOUS at 09:26

## 2023-01-01 RX ADMIN — SODIUM CHLORIDE, POTASSIUM CHLORIDE, SODIUM LACTATE AND CALCIUM CHLORIDE: 600; 310; 30; 20 INJECTION, SOLUTION INTRAVENOUS at 08:38

## 2023-01-01 RX ADMIN — PROPOFOL 20 MG: 10 INJECTION, EMULSION INTRAVENOUS at 09:24

## 2023-01-01 RX ADMIN — PROPOFOL 50 MG: 10 INJECTION, EMULSION INTRAVENOUS at 09:19

## 2023-01-01 RX ADMIN — ONDANSETRON 4 MG: 2 INJECTION INTRAMUSCULAR; INTRAVENOUS at 09:12

## 2023-01-01 RX ADMIN — PROPOFOL 20 MG: 10 INJECTION, EMULSION INTRAVENOUS at 09:33

## 2023-01-01 RX ADMIN — TRAMADOL HYDROCHLORIDE 50 MG: 50 TABLET, COATED ORAL at 03:22

## 2023-01-01 RX ADMIN — KETOROLAC TROMETHAMINE 15 MG: 15 INJECTION, SOLUTION INTRAMUSCULAR; INTRAVENOUS at 02:20

## 2023-01-01 RX ADMIN — PROPOFOL 20 MG: 10 INJECTION, EMULSION INTRAVENOUS at 09:31

## 2023-01-01 RX ADMIN — PROPOFOL 20 MG: 10 INJECTION, EMULSION INTRAVENOUS at 09:30

## 2023-01-01 RX ADMIN — PROPOFOL 20 MG: 10 INJECTION, EMULSION INTRAVENOUS at 09:36

## 2023-01-01 RX ADMIN — LIDOCAINE HYDROCHLORIDE 20 MG: 10 INJECTION, SOLUTION INFILTRATION; PERINEURAL at 09:16

## 2023-01-01 RX ADMIN — PROPOFOL 20 MG: 10 INJECTION, EMULSION INTRAVENOUS at 09:29

## 2023-01-01 ASSESSMENT — ENCOUNTER SYMPTOMS
SHORTNESS OF BREATH: 1
FACIAL SWELLING: 1

## 2023-01-01 ASSESSMENT — ACTIVITIES OF DAILY LIVING (ADL)
ADLS_ACUITY_SCORE: 35

## 2023-04-24 NOTE — ED PROVIDER NOTES
EMERGENCY DEPARTMENT ENCOUNTER     NAME: Lisandra Elias   AGE: 62 year old female   YOB: 1960   MRN: [id number]   EVALUATION DATE & TIME: 4/24/2023  8:30 AM   PCP: Drea Mckeon     Chief Complaint   Patient presents with     Shortness of Breath   :    FINAL IMPRESSION       1. Edema, unspecified type           ED COURSE & MEDICAL DECISION MAKING      Pertinent Labs & Imaging studies reviewed. (See chart for details)   62 year old female  presents to the Emergency Department for evaluation of bilateral leg swelling and orthopnea. Initial Vitals Reviewed. Initial exam notable for morbidly obese patient who actually does not have any appreciable pitting or nonpitting edema, and patient notes that it was bad yesterday but does not appear there today and she is in agreement with my assessment.  She has no hypoxia, but she does appear slightly short of breath when she lies flat and this may be secondary to body habitus.  I also considered CHF, hepatic dysfunction, renal failure.  There are no signs of cellulitis and I am much less suspicious of a bilateral DVT with no visible leg swelling, erythema or warmth.  I do not think we need imaging and she has a negative D-dimer that is age-adjusted.  EKG is nonischemic and she was kept on cardiac monitoring and did not have any arrhythmias.  Troponin is negative.  BNP is negative, hepatic function is normal, and creatinine is 1.1 which is not high enough to cause concern.  Patient feels reassured and we did discuss elevation, compression stockings, return precautions and PCP follow-up and she is comfortable with discharge           At the conclusion of the encounter I discussed the results of all of the tests and the disposition. The questions were answered. The patient or family acknowledged understanding and was agreeable with the care plan.         Medical Decision Making    History:    Supplemental history from: EMS    External Record(s) reviewed:  "Documented in chart, if applicable.    Work Up:    Chart documentation includes differential considered and any EKGs or imaging independently interpreted by provider, where specified.    In additional to work up documented, I considered the following work up: Documented in chart, if applicable.    External consultation:    Discussion of management with another provider: Documented in chart, if applicable    Complicating factors:    Care impacted by chronic illness: Diabetes    Care affected by social determinants of health: N/A    Disposition considerations: Discharge. No recommendations on prescription strength medication(s). I considered admission, but ultimately discharged patient With reassuring work-up.      8:32 AM I met with the patient to gather history and to perform my initial exam. We discussed plans for the ED course, including diagnostic testing and treatment.   12:02 PM I rechecked and updated the patient with results. Discussed plan of discharge, patient agreeable.       MEDICATIONS GIVEN IN THE EMERGENCY:   Medications - No data to display   NEW PRESCRIPTIONS STARTED AT TODAY'S ER VISIT   New Prescriptions    No medications on file     ================================================================   HISTORY OF PRESENT ILLNESS       Patient information was obtained from: Patient   Use of Intrepreter: N/A   Lisandra Elias is a 62 year old female with history of vertigo, hypertension, DM2, obesity, who presents with edema and shortness of breath.     Per EMS, patient has been experiencing an increase in generalized edema since 4/20. Endorses shortness of breath with exertion and laying flat. Denies any pain. Blood sugar 187.     Patient reports an increase in generalized edema since 4/20. She notes last night the swelling was the worst it has been. This morning, she notes her extremity edema is improved but her face still feels \"puffy\". Denies use of compression socks. Of note, patient recently " increased a dosing of DM2 medication.     Denies history of kidney failure, liver failure, and heart failure.     ================================================================    REVIEW OF SYSTEMS       Review of Systems   Constitutional:        No pain.   HENT: Positive for facial swelling (Subjective ).    Respiratory: Positive for shortness of breath (exertional ).    Cardiovascular: Positive for leg swelling.   Musculoskeletal:        Positive for generalized, diffuse, body edema.    All other systems reviewed and are negative.     PAST HISTORY     PAST MEDICAL HISTORY:   Past Medical History:   Diagnosis Date     Chronic pain      Diabetes mellitus (H)      History of anesthesia complications difficulty waking up     HTN (hypertension)      Migraine       PAST SURGICAL HISTORY:   Past Surgical History:   Procedure Laterality Date     APPENDECTOMY       HYSTERECTOMY N/A 2000's     OOPHORECTOMY Bilateral 2000's     [place] EDG US EXAM SURGICAL ALTER STOM DUODENUM/JEJUNUM N/A 11/10/2014    Procedure: ESOPHAGOGASTRODUODENOSCOPY/ENDOSCOPIC ULTRASOUND;  Surgeon: Brian Lawrence MD;  Location: [facility];  Service: Gastroenterology     WISDOM TOOTH EXTRACTION N/A       CURRENT MEDICATIONS:   aspirin 81 MG EC tablet  blood sugar diagnostic (GLUCOSE BLOOD) Strp  doxazosin (CARDURA) 8 MG tablet  ergocalciferol (VITAMIN D2) 50,000 unit capsule  fluticasone propionate (FLONASE) 50 mcg/actuation nasal spray  glipiZIDE (GLUCOTROL XL) 10 MG 24 hr tablet  hydroCHLOROthiazide (HYDRODIURIL) 25 MG tablet  Lancets MISC  loperamide (IMODIUM A-D) 2 mg tablet  losartan (COZAAR) 100 MG tablet  metFORMIN (GLUCOPHAGE-XR) 500 MG 24 hr tablet  metoprolol (TOPROL-XL) 50 MG 24 hr tablet  multivitamin therapeutic (THERAGRAN) tablet  pioglitazone (ACTOS) 15 MG tablet      ALLERGIES:   Allergies   Allergen Reactions     Amlodipine Unknown     Amoxicillin-Pot Clavulanate [Augmentin] Unknown     Clotrimazole-Betamethasone [Lotrisone] Unknown  "    Codeine Unknown     Gemfibrozil Unknown     Ibuprofen Unknown     Lisinopril Unknown      FAMILY HISTORY:   Family History   Problem Relation Age of Onset     Heart Disease Father      Ovarian Cancer Mother 83.00      SOCIAL HISTORY:   Social History     Socioeconomic History     Marital status:    Tobacco Use     Smoking status: Former     Packs/day: 1.00     Types: Cigarettes     Start date: 1974     Quit date: 1979     Years since quittin.3     Smokeless tobacco: Never   Substance and Sexual Activity     Alcohol use: Yes     Alcohol/week: 1.0 standard drink of alcohol     Drug use: Never        VITALS  Patient Vitals for the past 24 hrs:   BP Temp Pulse Resp SpO2 Height Weight   23 0840 -- 97.7  F (36.5  C) -- -- -- -- (!) 161.3 kg (355 lb 11.2 oz)   23 0836 -- -- -- -- -- 1.626 m (5' 4\") (!) 297.4 kg (655 lb 11.2 oz)   23 0834 (!) 148/64 -- 66 20 95 % -- --        ================================================================    PHYSICAL EXAM     VITAL SIGNS: BP (!) 148/64   Pulse 66   Temp 97.7  F (36.5  C)   Resp 20   Ht 1.626 m (5' 4\")   Wt (!) 161.3 kg (355 lb 11.2 oz)   SpO2 95%   BMI 61.06 kg/m     Constitutional:  Awake, no acute distress. Obese.   HENT:  Atraumatic, oropharynx without exudate or erythema, membranes moist  Lymph:  No adenopathy  Eyes: EOM intact, PERRL, no injection  Neck: Supple  Respiratory:  Clear to auscultation bilaterally, no wheezes or crackles   Cardiovascular:  Regular rate and rhythm, single S1 and S2   GI:  Soft, nontender, nondistended, no rebound or guarding   Musculoskeletal:  Moves all extremities, no lower extremity edema, no deformities. No noted swelling.   Skin:  Warm, dry  Neurologic:  Alert and oriented x3, no focal deficits noted       ================================================================  LAB       All pertinent labs reviewed and interpreted.   Labs Ordered and Resulted from Time of ED Arrival to Time of " ED Departure   COMPREHENSIVE METABOLIC PANEL - Abnormal       Result Value    Sodium 140      Potassium 3.7      Chloride 105      Carbon Dioxide (CO2) 23      Anion Gap 12      Urea Nitrogen 19      Creatinine 1.14 (*)     Calcium 9.9      Glucose 204 (*)     Alkaline Phosphatase 68      AST 25      ALT 18      Protein Total 6.5      Albumin 3.0 (*)     Bilirubin Total 0.7      GFR Estimate 53 (*)    CBC WITH PLATELETS AND DIFFERENTIAL - Abnormal    WBC Count 6.3      RBC Count 4.71      Hemoglobin 13.9      Hematocrit 41.5      MCV 88      MCH 29.5      MCHC 33.5      RDW 13.2      Platelet Count 106 (*)     % Neutrophils 58      % Lymphocytes 28      % Monocytes 10      % Eosinophils 3      % Basophils 1      % Immature Granulocytes 0      NRBCs per 100 WBC 0      Absolute Neutrophils 3.7      Absolute Lymphocytes 1.7      Absolute Monocytes 0.6      Absolute Eosinophils 0.2      Absolute Basophils 0.1      Absolute Immature Granulocytes 0.0      Absolute NRBCs 0.0     D DIMER QUANTITATIVE - Abnormal    D-Dimer Quantitative 0.58 (*)    B-TYPE NATRIURETIC PEPTIDE ([facility] ONLY) - Normal    BNP 20     TROPONIN I - Normal    Troponin I <0.01          ===============================================================  RADIOLOGY       Reviewed all pertinent imaging. Please see official radiology report.   No orders to display         ================================================================  EKG     EKG reviewed interpreted by me shows sinus rhythm with rate of 60, normal axis, low voltage, QTc 452 and appears generally similar to July 2020    I have independently reviewed and interpreted the EKG(s) documented above.     ================================================================  PROCEDURES         I, Kari Johnson, am serving as a scribe to document services personally performed by Dr. Davila based on my observation and the provider's statements to me. I, Jacque Davila MD attest that Kari Johnson  is acting in a scribe capacity, has observed my performance of the services and has documented them in accordance with my direction.   Jacque Davila M.D.   Emergency Medicine   [facility] EMERGENCY ROOM  [address] [id number] [phone]  Dept: [phone]        Jacque Davila MD  04/24/23 2627

## 2023-04-24 NOTE — ED TRIAGE NOTES
Pt here with increased shortness of breath since last Thursday. Pt feels like she has facial swelling and legs. Glucose 187 per EMS. Pt Type II diabetes.

## 2023-04-24 NOTE — DISCHARGE INSTRUCTIONS
As we discussed, the lungs are clear with no signs of fluid in your lungs, the testing does not show any kidney failure, liver failure, congestive heart failure and currently we do not see much noticeable swelling in your legs.  Try elevating your legs, using compression stockings.

## 2023-04-24 NOTE — ED NOTES
Bed: [facility]-10  Expected date: 4/24/23  Expected time: 8:23 AM  Means of arrival: Ambulance  Comments:

## 2023-05-25 NOTE — H&P
GENERAL PRE-PROCEDURE:   Procedure:  Colonoscopy  Date/Time:  5/25/2023 9:03 AM    Verbal consent obtained?: Yes    Written consent obtained?: Yes    Risks and benefits: Risks, benefits and alternatives were discussed    Consent given by:  Patient  Patient states understanding of procedure being performed: Yes    Patient's understanding of procedure matches consent: Yes    Procedure consent matches procedure scheduled: Yes    Expected level of sedation:  Deep  Appropriately NPO:  Yes  ASA Class:  3  Mallampati  :  Grade 2- soft palate, base of uvula, tonsillar pillars, and portion of posterior pharyngeal wall visible  Lungs:  Lungs clear with good breath sounds bilaterally  Heart:  Normal heart sounds and rate  History & Physical reviewed:  History and physical reviewed and no updates needed  Statement of review:  I have reviewed the lab findings, diagnostic data, medications, and the plan for sedation

## 2023-05-25 NOTE — CONSULTS
I have reviewed the surgical /preoperative H&P that is linked to this encounter, and examined the patient. There are no significant changes.    Chris Aponte MD

## 2023-05-25 NOTE — ANESTHESIA POSTPROCEDURE EVALUATION
Patient: Lisandra Elias    Procedure: Procedure(s):  COLONOSCOPY with polypectomies       Anesthesia Type:  MAC    Note:  Disposition: Outpatient   Postop Pain Control: Uneventful            Sign Out: Well controlled pain   PONV: No   Neuro/Psych: Uneventful            Sign Out: Acceptable/Baseline neuro status   Airway/Respiratory: Uneventful            Sign Out: Acceptable/Baseline resp. status   CV/Hemodynamics: Uneventful            Sign Out: Acceptable CV status; No obvious hypovolemia; No obvious fluid overload   Other NRE: NONE   DID A NON-ROUTINE EVENT OCCUR? No           Last vitals:  Vitals Value Taken Time   /56 05/25/23 1001   Temp 36.9  C (98.4  F) 05/25/23 0945   Pulse 65 05/25/23 1001   Resp 18 05/25/23 0945   SpO2 95 % 05/25/23 1001   Vitals shown include unvalidated device data.    Electronically Signed By: Philippe Roman MD  May 25, 2023  10:12 AM

## 2023-05-25 NOTE — ANESTHESIA PREPROCEDURE EVALUATION
"Anesthesia Pre-Procedure Evaluation    Patient: Lisandra Elias   MRN: [id number] : 1960        Procedure : Procedure(s):  COLONOSCOPY          Past Medical History:   Diagnosis Date     Arthritis      Chronic pain      Diabetes mellitus (H)      History of anesthesia complications difficulty waking up     HTN (hypertension)      Migraine      Motion sickness      Obese       Past Surgical History:   Procedure Laterality Date     APPENDECTOMY       HYSTERECTOMY N/A      OOPHORECTOMY Bilateral      [place] EDG US EXAM SURGICAL ALTER STOM DUODENUM/JEJUNUM N/A 11/10/2014    Procedure: ESOPHAGOGASTRODUODENOSCOPY/ENDOSCOPIC ULTRASOUND;  Surgeon: Brian Lawrence MD;  Location: [facility];  Service: Gastroenterology     WISDOM TOOTH EXTRACTION N/A       Allergies   Allergen Reactions     Amlodipine Headache and Unknown     Amoxicillin-Pot Clavulanate Nausea     Clotrimazole-Betamethasone Itching     Codeine Nausea     Gemfibrozil Unknown     Ibuprofen Unknown     Tolerates it, does not take regularly due to diabetes     Lisinopril Cough      Social History     Tobacco Use     Smoking status: Former     Packs/day: 1.00     Types: Cigarettes     Start date: 1974     Quit date: 1979     Years since quittin.4     Smokeless tobacco: Never   Vaping Use     Vaping status: Not on file   Substance Use Topics     Alcohol use: Yes     Alcohol/week: 1.0 standard drink of alcohol     Types: 1 Standard drinks or equivalent per week     Comment: \"rarely\"      Wt Readings from Last 1 Encounters:   23 (!) 157.9 kg (348 lb)        Anesthesia Evaluation            ROS/MED HX  ENT/Pulmonary:  - neg pulmonary ROS   (+) BLAIRE risk factors,     Neurologic:  - neg neurologic ROS     Cardiovascular:  - neg cardiovascular ROS   (+) hypertension-----    METS/Exercise Tolerance: >4 METS    Hematologic:  - neg hematologic  ROS     Musculoskeletal:  - neg musculoskeletal ROS     GI/Hepatic:  - neg GI/hepatic ROS   "   Renal/Genitourinary:  - neg Renal ROS     Endo:  - neg endo ROS   (+) type I DM, Obesity,     Psychiatric/Substance Use:  - neg psychiatric ROS     Infectious Disease:  - neg infectious disease ROS     Malignancy:  - neg malignancy ROS     Other:  - neg other ROS          Physical Exam    Airway  airway exam normal      Mallampati: II   TM distance: > 3 FB   Neck ROM: full     Respiratory Devices and Support         Dental           Cardiovascular   cardiovascular exam normal       Rhythm and rate: regular     Pulmonary   pulmonary exam normal        breath sounds clear to auscultation           OUTSIDE LABS:  CBC:   Lab Results   Component Value Date    WBC 6.3 04/24/2023    WBC 8.2 09/15/2021    HGB 13.9 04/24/2023    HGB 14.7 09/15/2021    HCT 41.5 04/24/2023    HCT 44.4 09/15/2021     (L) 04/24/2023     (L) 09/15/2021     BMP:   Lab Results   Component Value Date     04/24/2023     09/15/2021    POTASSIUM 3.7 04/24/2023    POTASSIUM 3.7 09/15/2021    CHLORIDE 105 04/24/2023    CHLORIDE 108 (H) 09/15/2021    CO2 23 04/24/2023    CO2 25 09/15/2021    BUN 19 04/24/2023    BUN 16 09/15/2021    CR 1.14 (H) 04/24/2023    CR 1.13 (H) 09/15/2021     (H) 04/24/2023     (H) 09/15/2021     COAGS:   Lab Results   Component Value Date    INR 1.10 09/15/2021     POC: No results found for: BGM, HCG, HCGS  HEPATIC:   Lab Results   Component Value Date    ALBUMIN 3.0 (L) 04/24/2023    PROTTOTAL 6.5 04/24/2023    ALT 18 04/24/2023    AST 25 04/24/2023    ALKPHOS 68 04/24/2023    BILITOTAL 0.7 04/24/2023     OTHER:   Lab Results   Component Value Date    A1C 7.4 (H) 06/25/2015    LAURYN 9.9 04/24/2023    LIPASE 20 07/18/2020       Anesthesia Plan    ASA Status:  3   NPO Status:  NPO Appropriate    Anesthesia Type: MAC.              Consents    Anesthesia Plan(s) and associated risks, benefits, and realistic alternatives discussed. Questions answered and patient/representative(s) expressed  understanding.    - Discussed:     - Discussed with:  Patient         Postoperative Care            Comments:                Philippe Roman MD

## 2023-06-03 NOTE — PROGRESS NOTES
Assessment & Plan     1. Sinus pressure  Right side     2. Acute mucoid otitis media of right ear    - cetirizine (ZYRTEC) 10 MG tablet; Take 1 tablet (10 mg) by mouth daily  Dispense: 30 tablet; Refill: 3  - cefdinir (OMNICEF) 300 MG capsule; Take 1 capsule (300 mg) by mouth 2 times daily for 10 days  Dispense: 20 capsule; Refill: 0  - fluconazole (DIFLUCAN) 200 MG tablet; 1 tab in 3 days then repeat 4 days  Dispense: 2 tablet; Refill: 0    Follow up in PCP clinic if not improving over the next week.                 [facility]    Cindi Fry is a 62 year old female who presents to clinic today for the following health issues:  Chief Complaint   Patient presents with     Ear Problem     Sinus problem     HPI    Here for right ear pain starting 3 days ago. Some discharge came out 2 nights ago. Right eyebrow is starting to hurt last night. Some mild pain to right cheek. Some cough 4-5 days ago. Some nasal congestion for 5-6 days. History of chronic sinus problems and seasonal allergies. Takes flonase 2x daily.               Review of Systems        Objective    /77   Pulse 69   Temp 97.9  F (36.6  C) (Oral)   Resp 16   Wt (!) 156.5 kg (345 lb)   SpO2 94%   BMI 59.22 kg/m    Physical Exam  Vitals and nursing note reviewed.   Constitutional:       General: She is not in acute distress.     Appearance: She is well-developed. She is not diaphoretic.   HENT:      Head: Normocephalic and atraumatic.      Right Ear: Ear canal and external ear normal. Tympanic membrane is erythematous and bulging.      Left Ear: Tympanic membrane and external ear normal.   Eyes:      Pupils: Pupils are equal, round, and reactive to light.   Pulmonary:      Effort: Pulmonary effort is normal. No respiratory distress.      Breath sounds: Normal breath sounds.   Musculoskeletal:      Cervical back: Normal range of motion and neck supple.   Lymphadenopathy:      Cervical:  No cervical adenopathy.   Skin:     General: Skin is warm and dry.   Neurological:      Mental Status: She is alert.      Cranial Nerves: No cranial nerve deficit.

## 2023-09-20 PROBLEM — R80.9 MICROALBUMINURIA: Status: ACTIVE | Noted: 2023-01-01

## 2023-09-20 NOTE — ED NOTES
[facility].

## 2023-09-20 NOTE — ED NOTES
[facility]  Expected date: 9/20/23  Expected time: 12:59 PM  Means of arrival: Ambulance  Comments:  Fall, shoulder pain

## 2023-09-20 NOTE — ED PROVIDER NOTES
EMERGENCY DEPARTMENT ENCOUNTER      NAME: Lisandra Elias  AGE: 63 year old female  YOB: 1960  MRN: [id number]  EVALUATION DATE & TIME: 9/20/2023  1:08 PM    PCP: Drea Mckeon    ED PROVIDER: Jennifer Aguillon MD      Chief Complaint   Patient presents with    Fall    Shoulder Injury         FINAL IMPRESSION:  1. Closed nondisplaced fracture of greater tuberosity of left humerus, initial encounter    2. Closed nondisplaced fracture of surgical neck of left humerus, unspecified fracture morphology, initial encounter          ED COURSE & MEDICAL DECISION MAKING:    Pertinent Labs & Imaging studies reviewed. (See chart for details)  63 year old female presents to the Emergency Department for evaluation after a mechanical fall.  Patient reports that she was holding her cat when she lost her balance and fell on her right shoulder.  She did not hit her head and had no LOC.  She denies any numbness or tingling of her upper extremity.  X-ray of the right shoulder demonstrates a fracture through the greater tuberosity and impacted fracture of the surgical neck.  Patient otherwise with no other signs of trauma.  Patient placed in a sling and recommend follow-up with orthopedics.  Also given a prescription for oxycodone to use for breakthrough pain not treated by Tylenol and ibuprofen.    At the conclusion of the encounter I discussed the results of all of the tests and the disposition. The questions were answered. The patient or family acknowledged understanding and was agreeable with the care plan.     1:15 PM I met with the patient to gather history and to perform my initial exam. We discussed plans for the ED course, including diagnostic testing and treatment.   2:44 PM I rechecked on the patient and updated them on any lab or radiology results.       Medical Decision Making    History:  Supplemental history from: Documented in chart, if applicable  External Record(s) reviewed: Documented in chart, if  applicable.    Work Up:  Chart documentation includes differential considered and any EKGs or imaging independently interpreted by provider, where specified.  In additional to work up documented, I considered the following work up: Documented in chart, if applicable.    External consultation:  Discussion of management with another provider: Documented in chart, if applicable    Complicating factors:  Care impacted by chronic illness: N/A  Care affected by social determinants of health: Access to Medical Care    Disposition considerations: Discharge. I prescribed additional prescription strength medication(s) as charted. See documentation for any additional details.      MEDICATIONS GIVEN IN THE EMERGENCY:  Medications   HYDROmorphone (PF) (DILAUDID) injection 0.5 mg (0.5 mg Intravenous $Given 9/20/23 1337)   ondansetron (ZOFRAN) injection 4 mg (4 mg Intravenous $Given 9/20/23 1332)       NEW PRESCRIPTIONS STARTED AT TODAY'S ER VISIT  New Prescriptions    OXYCODONE (ROXICODONE) 5 MG TABLET    Take 1 tablet (5 mg) by mouth every 6 hours as needed for pain          =================================================================    HPI    Patient information was obtained from: patient    Use of : N/A        Lisandra Elias is a 63 year old female with a pertinent history of hypertension, migraine, chronic pain, type 2 diabetes mellitus, arthritis, and obesity who presents to this ED via EMS for evaluation of a fall.     Patient present with a fall that occurred within one hour of ED arrival. Patient was carrying her cat when the cat jumped out of her hands, causing the patient to lose her balance and fall. She reports all her weight landed on her right shoulder. Reports of right-shoulder pain. Denies hitting head or LoC. She was able to get up onto her legs. Denies any numbness or tingling in the right upper extremity.    Patient does not report of any other medical concerns or complaints at this time.      PAST MEDICAL HISTORY:  Past Medical History:   Diagnosis Date    Arthritis     Chronic pain     Diabetes mellitus (H)     History of anesthesia complications difficulty waking up    HTN (hypertension)     Migraine     Motion sickness     Obese        PAST SURGICAL HISTORY:  Past Surgical History:   Procedure Laterality Date    APPENDECTOMY      COLONOSCOPY N/A 5/25/2023    Procedure: COLONOSCOPY with polypectomies;  Surgeon: Chris Aponte MD;  Location: [facility] Main OR    HYSTERECTOMY N/A 2000's    OOPHORECTOMY Bilateral 2000's    [facility] [place] EXAM SURGICAL ALTER STOM DUODENUM/JEJUNUM N/A 11/10/2014    Procedure: ESOPHAGOGASTRODUODENOSCOPY/ENDOSCOPIC ULTRASOUND;  Surgeon: Brian Lawrence MD;  Location: [facility];  Service: Gastroenterology    WISDOM TOOTH EXTRACTION N/A            CURRENT MEDICATIONS:    oxyCODONE (ROXICODONE) 5 MG tablet  aspirin 81 MG EC tablet  blood sugar diagnostic (GLUCOSE BLOOD) Strp  cetirizine (ZYRTEC) 10 MG tablet  doxazosin (CARDURA) 8 MG tablet  fluconazole (DIFLUCAN) 200 MG tablet  fluticasone propionate (FLONASE) 50 mcg/actuation nasal spray  folic acid 0.8 MG CAPS  glipiZIDE (GLUCOTROL XL) 10 MG 24 hr tablet  hydroCHLOROthiazide (HYDRODIURIL) 25 MG tablet  Lancets MISC  loperamide (IMODIUM A-D) 2 mg tablet  losartan (COZAAR) 100 MG tablet  metoprolol (TOPROL-XL) 50 MG 24 hr tablet  pravastatin (PRAVACHOL) 20 MG tablet  Vitamin D3 (CHOLECALCIFEROL) 25 mcg (1000 units) tablet        ALLERGIES:  Allergies   Allergen Reactions    Amlodipine Headache and Unknown    Amoxicillin-Pot Clavulanate Nausea    Clotrimazole-Betamethasone Itching    Codeine Nausea    Gemfibrozil Unknown    Ibuprofen Unknown     Tolerates it, does not take regularly due to diabetes    Lisinopril Cough       FAMILY HISTORY:  Family History   Problem Relation Age of Onset    Heart Disease Father     Ovarian Cancer Mother 83.00       SOCIAL HISTORY:   Social History     Socioeconomic History    Marital  "status:    Tobacco Use    Smoking status: Former     Packs/day: 1.00     Types: Cigarettes     Start date: 1974     Quit date: 1979     Years since quittin.7    Smokeless tobacco: Never   Substance and Sexual Activity    Alcohol use: Yes     Alcohol/week: 1.0 standard drink of alcohol     Types: 1 Standard drinks or equivalent per week     Comment: \"rarely\"    Drug use: Never       VITALS:  /56 (BP Location: Left arm)   Pulse 64   Temp 98  F (36.7  C) (Temporal)   Resp 19   Ht 1.626 m (5' 4\")   Wt (!) 156 kg (344 lb)   SpO2 92%   BMI 59.05 kg/m      PHYSICAL EXAM    Gen:  Alert, awake, NAD  HENT:  Head atraumatic, normocephalic.  PERRL.  EOMI.  No periorbital step-offs, depression, tenderness.  No tenderness along the zygomatic arch bilaterally.  Ears atraumatic with no external bleeding or signs of trauma.  No epistaxis.  Clear oropharynx.  Dentition intact.   Respiratory:  Normal respiratory rate.  Lungs CTA.  Chest wall stable to compression.  Nontender chest wall.   Trachea midline.  Cardiovascular:  Regular rate and rhythm.  Palpable radial and DP pulses bilaterally.  Abdomen:  Soft, nontender, normoactive bowel sounds.    Musculoskeletal:  No midline C-spine, T-spine, L-spine tenderness.  No midline spinal step-offs noted.  FROM in all extremities.  5/5 strength in all extremities.  No gross deformities noted.  Pelvis stable.    Integument:  No ecchymosis, abrasions, hematomas, lacerations noted. Tenderness over right posterior scapula.    Neuro:  GCS 15, A & O x 3, sensation intact to light touch       LAB:  All pertinent labs reviewed and interpreted.  Results for orders placed or performed during the hospital encounter of 23   XR Shoulder Right G/E 3 Views    Impression    IMPRESSION: Nondisplaced fracture of the greater tuberosity. There is also a subtle slightly impacted fracture of the surgical neck which is less well visualized. Normal glenohumeral alignment. " Moderate degenerative changes in the acromioclavicular   joint.       RADIOLOGY:  Reviewed all pertinent imaging. Please see official radiology report.  XR Shoulder Right G/E 3 Views   Final Result   IMPRESSION: Nondisplaced fracture of the greater tuberosity. There is also a subtle slightly impacted fracture of the surgical neck which is less well visualized. Normal glenohumeral alignment. Moderate degenerative changes in the acromioclavicular    joint.              I, Moise Reyes, am serving as a scribe to document services personally performed by Jennifer Aguillon, based on my observation and the provider's statements to me. I, Jennifer Aguillon MD, attest that Moise Reyes is acting in a scribe capacity, has observed my performance of the services and has documented them in accordance with my direction.    Jennifer Aguillon MD  Emergency Medicine  [facility] EMERGENCY ROOM  [facility] [facility] [address] [address] [phone]       Jennifer Aguillon MD  09/20/23 6862

## 2023-09-28 NOTE — PROGRESS NOTES
CC: Right Shoulder Injury    HPI:Patient is a 63 year old, right hand dominant female seen today in consultation for right humerus fracture.  She states that on September 20 she fell while chasing her cat.  She fell onto her right shoulder.  She was seen at an urgent care where x-rays were reviewed a nondisplaced fracture of her greater tuberosity and suggestion of a nondisplaced surgical neck fracture.  She is placed in a sling and recommended follow-up with orthopedic surgery.  Since that time she has had pain in the right shoulder.  She denies pain in the elbow or wrist.  She has been doing wrist finger and elbow range of motion activities.  Her shoulder is still too painful to do any shoulder range of motion activities.  She denies any other pain or injury.  She is right-hand dominant.  She is prescribed p.o. oxycodone from the ER but this gives her constipation.  She has been alternate ibuprofen and Tylenol for pain control this is working well for her.  She is never had an injection to the right shoulder.  He has never had surgery the right shoulder.  She has no prior injury to the right shoulder.  She denies any numbness or tingling in her hand.    She has a history of type 2 diabetes.  She reports her last hemoglobin A1c was 8.1.  She is a retired .  She is right dominant.  She does not smoke.  She enjoys painting as a hobby.       Patient Active Problem List   Diagnosis    Type 2 Diabetes Mellitus - Uncomplicated, Controlled    Obesity    Benign Paroxysmal Positional Vertigo    Hypertension    Localized Osteoarthritis Of The Neck    Microalbuminuria    Chronic nonalcoholic liver disease    Diverticular disease of colon    Microalbuminuria          Past Medical History:   Diagnosis Date    Arthritis     Chronic pain     Diabetes mellitus (H)     History of anesthesia complications difficulty waking up    HTN (hypertension)     Migraine     Motion sickness     Obese           Past Surgical  History:   Procedure Laterality Date    APPENDECTOMY      COLONOSCOPY N/A 5/25/2023    Procedure: COLONOSCOPY with polypectomies;  Surgeon: Chris Aponte MD;  Location: [facility] Main OR    HYSTERECTOMY N/A 2000's    OOPHORECTOMY Bilateral 2000's    [place] [facility] US EXAM SURGICAL ALTER STOM DUODENUM/JEJUNUM N/A 11/10/2014    Procedure: ESOPHAGOGASTRODUODENOSCOPY/ENDOSCOPIC ULTRASOUND;  Surgeon: Brian Lawrence MD;  Location: [facility];  Service: Gastroenterology    WISDOM TOOTH EXTRACTION N/A           Current Outpatient Medications   Medication Sig Dispense Refill    aspirin 81 MG EC tablet [ASPIRIN 81 MG EC TABLET] Take 81 mg by mouth daily.      blood sugar diagnostic (GLUCOSE BLOOD) Strp [BLOOD SUGAR DIAGNOSTIC (GLUCOSE BLOOD) STRP] OneTouch Ultra Blue In Vitro Strip     Test 3 times daily      cetirizine (ZYRTEC) 10 MG tablet Take 1 tablet (10 mg) by mouth daily 30 tablet 3    doxazosin (CARDURA) 8 MG tablet Take 4 mg by mouth daily      fluconazole (DIFLUCAN) 200 MG tablet 1 tab in 3 days then repeat 4 days 2 tablet 0    fluticasone propionate (FLONASE) 50 mcg/actuation nasal spray [FLUTICASONE PROPIONATE (FLONASE) 50 MCG/ACTUATION NASAL SPRAY] Apply 1 spray into each nostril daily.      folic acid 0.8 MG CAPS Take 1 mg by mouth daily      glipiZIDE (GLUCOTROL XL) 10 MG 24 hr tablet Take 10 mg by mouth 2 times daily      hydroCHLOROthiazide (HYDRODIURIL) 25 MG tablet [HYDROCHLOROTHIAZIDE (HYDRODIURIL) 25 MG TABLET] Take 25 mg by mouth daily.      Lancets MISC [LANCETS MISC] OneTouch Lancets Miscellaneous      loperamide (IMODIUM A-D) 2 mg tablet [LOPERAMIDE (IMODIUM A-D) 2 MG TABLET] Take 1 mg by mouth daily.      losartan (COZAAR) 100 MG tablet [LOSARTAN (COZAAR) 100 MG TABLET] Take 100 mg by mouth daily.      metoprolol (TOPROL-XL) 50 MG 24 hr tablet [METOPROLOL (TOPROL-XL) 50 MG 24 HR TABLET] Take 50 mg by mouth daily.      pravastatin (PRAVACHOL) 20 MG tablet Take 20 mg by mouth daily      Vitamin D3  "(CHOLECALCIFEROL) 25 mcg (1000 units) tablet Take 1 tablet by mouth daily            Allergies   Allergen Reactions    Amlodipine Headache and Unknown    Amoxicillin-Pot Clavulanate Nausea    Clotrimazole-Betamethasone Itching    Codeine Nausea    Gemfibrozil Unknown    Ibuprofen Unknown     Tolerates it, does not take regularly due to diabetes    Lisinopril Cough          Family History   Problem Relation Age of Onset    Heart Disease Father     Ovarian Cancer Mother 83.00          Social History     Tobacco Use    Smoking status: Former     Packs/day: 1.00     Types: Cigarettes     Start date: 1974     Quit date: 1979     Years since quittin.7    Smokeless tobacco: Never   Substance Use Topics    Alcohol use: Yes     Alcohol/week: 1.0 standard drink of alcohol     Types: 1 Standard drinks or equivalent per week     Comment: \"rarely\"            Objective:  Physical Exam:  RUE: No gross deformity of the shoulder.  No open wounds or lacerations.  No surgical incisions.  No erythema or ecchymosis.  No pain to palpation over the clavicle, AC joint, acromion, or scapular spine.  Pain with any attempted range of motion of the shoulder.  Further range of motion exams were deferred for the right shoulder.  No pain to palpation over the medial epicondyle, lateral epicondyle, or olecranon of the elbow.  No pain with gentle passive range of motion of the elbow from full extension to 120 degrees of flexion.  5/5 wrist flexion extension as well as flexion extension at the MCP, PIP, DIP of all 5 digits.  Sensation intact in axillary, radial, median, and ulnar nerve distribution.  She does fire her deltoid.    Imagin view x-ray of the right shoulder including Velpeau view from today shows a nondisplaced fracture of the greater tuberosity.  I do not appreciate any definitive fracture across the surgical neck of the humerus.  There is some pseudosubluxation of the glenohumeral joint likely due to deltoid atony.  " Mild AC joint arthritis.  No subluxation or dislocation noted on the available view.    Assessment and Plan: Patient is a 63-year-old right-hand-dominant female who is 8 days status post a ground-level fall with a nondisplaced greater tuberosity fracture of her proximal humerus.  I discussed that operative and nonoperative options.  I discussed with her that given her that the fracture is nondisplaced I would not recommend operative intervention for this fracture.  I think this fracture will heal reliably without mentations and function with nonoperative management.  However, I did discuss with her the risk of frozen shoulder given that she is diabetic and has an injury to the shoulder.  Therefore I do want to start her on range of motion with physical therapy earlier in her treatment course, particularly since her fracture is stable.  At this time she is still fairly painful I do not think we are quite ready for that yet.  I also discussed with her the importance of daily range of motion of the elbow to prevent elbow stiffness.  Had the opportunity answer questions at this time.  She is understanding of this risk.  I will have him follow-up in 2 weeks for reevaluation and likely start physical therapy at that time.    Miguel Wood MD  Orthopedic Surgery

## 2023-09-28 NOTE — LETTER
9/28/2023         RE: Lisandra Elias  [address]   [place] [address]        Dear Colleague,    Thank you for referring your patient, Lisandra Elias, to the [facility]. Please see a copy of my visit note below.    CC: Right Shoulder Injury    HPI:Patient is a 63 year old, right hand dominant female seen today in consultation for right humerus fracture.  She states that on September 20 she fell while chasing her cat.  She fell onto her right shoulder.  She was seen at an urgent care where x-rays were reviewed a nondisplaced fracture of her greater tuberosity and suggestion of a nondisplaced surgical neck fracture.  She is placed in a sling and recommended follow-up with orthopedic surgery.  Since that time she has had pain in the right shoulder.  She denies pain in the elbow or wrist.  She has been doing wrist finger and elbow range of motion activities.  Her shoulder is still too painful to do any shoulder range of motion activities.  She denies any other pain or injury.  She is right-hand dominant.  She is prescribed p.o. oxycodone from the ER but this gives her constipation.  She has been alternate ibuprofen and Tylenol for pain control this is working well for her.  She is never had an injection to the right shoulder.  He has never had surgery the right shoulder.  She has no prior injury to the right shoulder.  She denies any numbness or tingling in her hand.    She has a history of type 2 diabetes.  She reports her last hemoglobin A1c was 8.1.  She is a retired .  She is right dominant.  She does not smoke.  She enjoys painting as a hobby.       Patient Active Problem List   Diagnosis     Type 2 Diabetes Mellitus - Uncomplicated, Controlled     Obesity     Benign Paroxysmal Positional Vertigo     Hypertension     Localized Osteoarthritis Of The Neck     Microalbuminuria     Chronic nonalcoholic liver disease     Diverticular disease of colon      Microalbuminuria          Past Medical History:   Diagnosis Date     Arthritis      Chronic pain      Diabetes mellitus (H)      History of anesthesia complications difficulty waking up     HTN (hypertension)      Migraine      Motion sickness      Obese           Past Surgical History:   Procedure Laterality Date     APPENDECTOMY       COLONOSCOPY N/A 5/25/2023    Procedure: COLONOSCOPY with polypectomies;  Surgeon: Chris Aponte MD;  Location: [facility] Main OR     HYSTERECTOMY N/A 2000's     OOPHORECTOMY Bilateral 2000's     [place] EDG US EXAM SURGICAL ALTER STOM DUODENUM/JEJUNUM N/A 11/10/2014    Procedure: ESOPHAGOGASTRODUODENOSCOPY/ENDOSCOPIC ULTRASOUND;  Surgeon: Brian Lawrence MD;  Location: [facility] GI;  Service: Gastroenterology     WISDOM TOOTH EXTRACTION N/A           Current Outpatient Medications   Medication Sig Dispense Refill     aspirin 81 MG EC tablet [ASPIRIN 81 MG EC TABLET] Take 81 mg by mouth daily.       blood sugar diagnostic (GLUCOSE BLOOD) Strp [BLOOD SUGAR DIAGNOSTIC (GLUCOSE BLOOD) STRP] OneTouch Ultra Blue In Vitro Strip     Test 3 times daily       cetirizine (ZYRTEC) 10 MG tablet Take 1 tablet (10 mg) by mouth daily 30 tablet 3     doxazosin (CARDURA) 8 MG tablet Take 4 mg by mouth daily       fluconazole (DIFLUCAN) 200 MG tablet 1 tab in 3 days then repeat 4 days 2 tablet 0     fluticasone propionate (FLONASE) 50 mcg/actuation nasal spray [FLUTICASONE PROPIONATE (FLONASE) 50 MCG/ACTUATION NASAL SPRAY] Apply 1 spray into each nostril daily.       folic acid 0.8 MG CAPS Take 1 mg by mouth daily       glipiZIDE (GLUCOTROL XL) 10 MG 24 hr tablet Take 10 mg by mouth 2 times daily       hydroCHLOROthiazide (HYDRODIURIL) 25 MG tablet [HYDROCHLOROTHIAZIDE (HYDRODIURIL) 25 MG TABLET] Take 25 mg by mouth daily.       Lancets MISC [LANCETS MISC] OneTouch Lancets Miscellaneous       loperamide (IMODIUM A-D) 2 mg tablet [LOPERAMIDE (IMODIUM A-D) 2 MG TABLET] Take 1 mg by mouth daily.    "    losartan (COZAAR) 100 MG tablet [LOSARTAN (COZAAR) 100 MG TABLET] Take 100 mg by mouth daily.       metoprolol (TOPROL-XL) 50 MG 24 hr tablet [METOPROLOL (TOPROL-XL) 50 MG 24 HR TABLET] Take 50 mg by mouth daily.       pravastatin (PRAVACHOL) 20 MG tablet Take 20 mg by mouth daily       Vitamin D3 (CHOLECALCIFEROL) 25 mcg (1000 units) tablet Take 1 tablet by mouth daily            Allergies   Allergen Reactions     Amlodipine Headache and Unknown     Amoxicillin-Pot Clavulanate Nausea     Clotrimazole-Betamethasone Itching     Codeine Nausea     Gemfibrozil Unknown     Ibuprofen Unknown     Tolerates it, does not take regularly due to diabetes     Lisinopril Cough          Family History   Problem Relation Age of Onset     Heart Disease Father      Ovarian Cancer Mother 83.00          Social History     Tobacco Use     Smoking status: Former     Packs/day: 1.00     Types: Cigarettes     Start date: 1974     Quit date: 1979     Years since quittin.7     Smokeless tobacco: Never   Substance Use Topics     Alcohol use: Yes     Alcohol/week: 1.0 standard drink of alcohol     Types: 1 Standard drinks or equivalent per week     Comment: \"rarely\"            Objective:  Physical Exam:  RUE: No gross deformity of the shoulder.  No open wounds or lacerations.  No surgical incisions.  No erythema or ecchymosis.  No pain to palpation over the clavicle, AC joint, acromion, or scapular spine.  Pain with any attempted range of motion of the shoulder.  Further range of motion exams were deferred for the right shoulder.  No pain to palpation over the medial epicondyle, lateral epicondyle, or olecranon of the elbow.  No pain with gentle passive range of motion of the elbow from full extension to 120 degrees of flexion.  5/5 wrist flexion extension as well as flexion extension at the MCP, PIP, DIP of all 5 digits.  Sensation intact in axillary, radial, median, and ulnar nerve distribution.  She does fire her " deltoid.    Imagin view x-ray of the right shoulder including Velpeau view from today shows a nondisplaced fracture of the greater tuberosity.  I do not appreciate any definitive fracture across the surgical neck of the humerus.  There is some pseudosubluxation of the glenohumeral joint likely due to deltoid atony.  Mild AC joint arthritis.  No subluxation or dislocation noted on the available view.    Assessment and Plan: Patient is a 63-year-old right-hand-dominant female who is 8 days status post a ground-level fall with a nondisplaced greater tuberosity fracture of her proximal humerus.  I discussed that operative and nonoperative options.  I discussed with her that given her that the fracture is nondisplaced I would not recommend operative intervention for this fracture.  I think this fracture will heal reliably without mentations and function with nonoperative management.  However, I did discuss with her the risk of frozen shoulder given that she is diabetic and has an injury to the shoulder.  Therefore I do want to start her on range of motion with physical therapy earlier in her treatment course, particularly since her fracture is stable.  At this time she is still fairly painful I do not think we are quite ready for that yet.  I also discussed with her the importance of daily range of motion of the elbow to prevent elbow stiffness.  Had the opportunity answer questions at this time.  She is understanding of this risk.  I will have him follow-up in 2 weeks for reevaluation and likely start physical therapy at that time.    Miguel Wood MD  Orthopedic Surgery      Again, thank you for allowing me to participate in the care of your patient.        Sincerely,        Miguel Wood MD

## 2023-10-02 NOTE — TELEPHONE ENCOUNTER
Patient Returning Call    Reason for call:  Patient wanting to speak to RN about taking arm out  of the sling to take a shower and if that would be ok, she  is experiencing some pain\ requesting callback     Information relayed to patient:  te sent to clinic     Patient has additional questions:  No      Could we send this information to you in [facility] or would you prefer to receive a phone call?:   Patient would prefer a phone call   Okay to leave a detailed message?: Yes at Cell number on file:    Telephone Information:   Mobile [phone]

## 2023-10-02 NOTE — TELEPHONE ENCOUNTER
Per Dr. Wood, ok for patient to take her arm out of the sling to shower and can let it hand straight.     Phone call to patient. She has a home health aid coming tomorrow to help her shower. She does not have a shower chair, but will look into getting one from a friend for future.   Recommended since she has a bath aid to have her hold her arm flexed with her other arm and have the home health aid wash under her armpit. She may let her arm hand down straight if needed but needs to be careful and limit how much she is moving her arm.     She has been doing ROM with the elbow.   She was appreciative of the call and verbalized understanding.     ALINE Garcia RN

## 2023-10-12 NOTE — PATIENT INSTRUCTIONS
[facility]   [address] [place] [address], [address]  [place] [address] [address] [place] [address]   Appointments: [phone] Appointments: [phone]   Fax: [phone] Fax: [phone]       1. Closed nondisplaced fracture of surgical neck of left humerus, unspecified fracture morphology, initial encounter        PHYSICAL THERAPY:  Physical Therapy orders have been placed with [facility].  You can call [phone] to schedule at your convenience.       Follow up with Dr. Wood in 3 weeks .    Call my office with any questions or concerns, [phone].

## 2023-10-12 NOTE — LETTER
10/12/2023         RE: Lisandra Elias  [address]   [place] [address]        Dear Colleague,    Thank you for referring your patient, Lisandra Elias, to the [facility]. Please see a copy of my visit note below.    CC: Right proximal humerus fracture     HPI: Patient is a 63 year old, right hand dominant female seen today in follow up for right proximalr humerus fracture .   Just over 3 weeks out from the injury.  She states the pain has been greatly improving since I last saw her.  She has been doing her Codman exercises and feels she is improving her range of motion.  She is still in her shoulder immobilizer.  She denies any numbness or tingling in the axillary nerve distribution or in her hand.    Subjective:   PE:  RUE: Pain with any active or passive range of motion of the shoulder.  Fires deltoid.  Full range of motion of the elbow and wrist.  5/5 wrist flexion extension as well as flexion extension of the MCP, PIP, and DIP.  Sensation intact in axillary, radial, median, and ulnar nerve distribution of the upper extremity.        Imaging:   3 view x-ray of the right shoulder from today was reviewed.  This redemonstrates a nondisplaced greater tuberosity fracture.  I not appreciate any interval change in fracture position since last visit.  I do not appreciate any bony callus formation yet.    A/P:  Patient is 63-year-old female presents 3 weeks status post a right closed nondisplaced greater tuberosity fracture.  X-rays show the fracture is stable.  At this time I would like her to start physical therapy for range of motion.  We did discuss that she has had an increased risk for developing adhesive capsulitis due to some of her medical comorbidities.  We will start physical therapy twice a week for 6 weeks for progressive passive range of motion.  I will place restrictions on her passive range of motion until seen in follow-up.  No more than 90 degrees of flexion, 90  degrees abduction, limited external rotation, palmar and internal rotation.  I would like for her to refrain from active range of motion until I see her back at her 6-week follow-up.  Advised her not to lift greater than 1 pound with her injured arm.  She will follow-up with me in another 3 to 4 weeks for x-rays and reassessment.    Miguel Wood  Orthopedic Surgery      Again, thank you for allowing me to participate in the care of your patient.        Sincerely,        Miguel Wood MD

## 2023-10-12 NOTE — PROGRESS NOTES
CC: Right proximal humerus fracture     HPI: Patient is a 63 year old, right hand dominant female seen today in follow up for right proximalr humerus fracture .   Just over 3 weeks out from the injury.  She states the pain has been greatly improving since I last saw her.  She has been doing her Codman exercises and feels she is improving her range of motion.  She is still in her shoulder immobilizer.  She denies any numbness or tingling in the axillary nerve distribution or in her hand.    Subjective:   PE:  RUE: Pain with any active or passive range of motion of the shoulder.  Fires deltoid.  Full range of motion of the elbow and wrist.  5/5 wrist flexion extension as well as flexion extension of the MCP, PIP, and DIP.  Sensation intact in axillary, radial, median, and ulnar nerve distribution of the upper extremity.        Imaging:   3 view x-ray of the right shoulder from today was reviewed.  This redemonstrates a nondisplaced greater tuberosity fracture.  I not appreciate any interval change in fracture position since last visit.  I do not appreciate any bony callus formation yet.    A/P:  Patient is 63-year-old female presents 3 weeks status post a right closed nondisplaced greater tuberosity fracture.  X-rays show the fracture is stable.  At this time I would like her to start physical therapy for range of motion.  We did discuss that she has had an increased risk for developing adhesive capsulitis due to some of her medical comorbidities.  We will start physical therapy twice a week for 6 weeks for progressive passive range of motion.  I will place restrictions on her passive range of motion until seen in follow-up.  No more than 90 degrees of flexion, 90 degrees abduction, limited external rotation, palmar and internal rotation.  I would like for her to refrain from active range of motion until I see her back at her 6-week follow-up.  Advised her not to lift greater than 1 pound with her injured arm.  She  will follow-up with me in another 3 to 4 weeks for x-rays and reassessment.    Miguel Wood  Orthopedic Surgery

## 2023-11-02 NOTE — PROGRESS NOTES
CC: 6 weeks status post right proximal humerus fracture    HPI: Patient is a 63 year old, female known to my clinic with a right nondisplaced greater tuberosity fracture.  She is 6 weeks out from her injury.  She has been doing physical therapy.  She states that her pain is greatly improving as well as her active and passive range of motion.  She is very happy with her progress.    Objective:   PE:  RUE: Passive range of motion of the right shoulder 110 degrees of flexion, 90 degrees abduction, 30 degrees external rotation, beltline internal rotation.  Active range of motion is equivalent to passive range of motion.  Sensation intact in axillary, radial, median, and ulnar nerve distributions upper extremity    Imaging:   3 view x-ray of the right shoulder shows the fracture on the greater tuberosity is much more difficult to see today with slight callus formation suggested of appropriate healing.  Pseudosubluxation seen previously is rapidly improving.    A/P:  Patient is a 63-year-old female 6 weeks status post a closed right nondisplaced greater tuberosity fracture.  Overall she is doing very well.  I discussed with her today that she can discontinue the sling.  She is to continue physical therapy.  I will not place any restrictions on her active and passive range of motion.  I will also not place any formal weight lifting restrictions, but she is to progress over the next 6 weeks with physical therapy as symptoms allow.  I will see her back in 6 to 8 weeks.  Goal is for full range of motion at that time    Miguel Wood  Orthopedic Surgery

## 2023-11-02 NOTE — LETTER
11/2/2023         RE: Lisandra Elias  [address]   [place] [address]        Dear Colleague,    Thank you for referring your patient, Lisandra Elias, to the [facility]. Please see a copy of my visit note below.    CC: 6 weeks status post right proximal humerus fracture    HPI: Patient is a 63 year old, female known to my clinic with a right nondisplaced greater tuberosity fracture.  She is 6 weeks out from her injury.  She has been doing physical therapy.  She states that her pain is greatly improving as well as her active and passive range of motion.  She is very happy with her progress.    Objective:   PE:  RUE: Passive range of motion of the right shoulder 110 degrees of flexion, 90 degrees abduction, 30 degrees external rotation, beltline internal rotation.  Active range of motion is equivalent to passive range of motion.  Sensation intact in axillary, radial, median, and ulnar nerve distributions upper extremity    Imaging:   3 view x-ray of the right shoulder shows the fracture on the greater tuberosity is much more difficult to see today with slight callus formation suggested of appropriate healing.  Pseudosubluxation seen previously is rapidly improving.    A/P:  Patient is a 63-year-old female 6 weeks status post a closed right nondisplaced greater tuberosity fracture.  Overall she is doing very well.  I discussed with her today that she can discontinue the sling.  She is to continue physical therapy.  I will not place any restrictions on her active and passive range of motion.  I will also not place any formal weight lifting restrictions, but she is to progress over the next 6 weeks with physical therapy as symptoms allow.  I will see her back in 6 to 8 weeks.  Goal is for full range of motion at that time    Miguel Wood  Orthopedic Surgery      Again, thank you for allowing me to participate in the care of your patient.        Sincerely,        Miguel Wood,  MD

## 2023-11-02 NOTE — PATIENT INSTRUCTIONS
[facility]   [address] [place] [address], [facility] [address]  [place] [address] [address] [place] [address]   Appointments: [phone] Appointments: [phone]   Fax: [phone] Fax: [phone]       1. Closed nondisplaced fracture of surgical neck of right humerus        Follow up with Dr. Wood in 6-8 weeks .    Call my office with any questions or concerns, [phone].

## 2023-11-07 NOTE — ED TRIAGE NOTES
Patient presents to the ED complaining of a bloody nose that started around 1900 last night.  She states she was able to get it to stop for awhile but then it started again.  Last bloody nose was three weeks ago and patient still have nose clamps so comes in with that applied and bleeding controlled.  She denies being on a blood thinner and offer no other complaint.     Triage Assessment (Adult)       Row Name 11/07/23 0242          Triage Assessment    Airway WDL WDL        Respiratory WDL    Respiratory WDL WDL        Skin Circulation/Temperature WDL    Skin Circulation/Temperature WDL WDL        Cardiac WDL    Cardiac WDL WDL        Peripheral/Neurovascular WDL    Peripheral Neurovascular WDL WDL        Cognitive/Neuro/Behavioral WDL    Cognitive/Neuro/Behavioral WDL WDL

## 2023-11-07 NOTE — DISCHARGE INSTRUCTIONS
Please follow-up with your primary care or ear nose and throat doctor Friday for packing removal  If your nosebleed recurs, then apply the nasal clamp for at least 15 to 30 minutes.  You can remove and if still bleeding then place the nasal clamp back and return to the emergency department or clinic  Tylenol 650 mg every 4 hours as needed for pain

## 2023-11-07 NOTE — ED PROVIDER NOTES
EMERGENCY DEPARTMENT ENCOUnter      NAME: Lisandra Elias  AGE: 63 year old female  YOB: 1960  MRN: [id number]  EVALUATION DATE & TIME: 11/7/2023  2:34 AM    PCP: Drea Mckeon    ED PROVIDER: Tiffany Boudreaux MD      Chief Complaint   Patient presents with    Epistaxis         FINAL IMPRESSION:  1. Epistaxis          ED COURSE & MEDICAL DECISION MAKING:      In summary, the patient is a 63-year-old female who presents to the emergency department for evaluation of a right-sided nosebleed that was easily controlled with a Rhino Rocket.  Recommended close outpatient follow-up with primary care or ENT for packing removal in the next 4 to 5 days    0238- I met with the patient, obtained history, performed an initial exam, and discussed options and plan for diagnostics and treatment here in the ED. Packing placed.  Ultram 50 mg p.o. was administered for pain.  0330-hemostasis achieved.  0400-no active bleeding.  Blood has cleared from posterior oropharynx    Medical Decision Making    History:  Supplemental history from: Documented in chart, if applicable  External Record(s) reviewed: Documented in chart, if applicable.    Work Up:  Chart documentation includes differential considered and any EKGs or imaging independently interpreted by provider, where specified.  In additional to work up documented, I considered the following work up: Documented in chart, if applicable.    External consultation:  Discussion of management with another provider: Documented in chart, if applicable    Complicating factors:  Care impacted by chronic illness: Diabetes and Hypertension  Care affected by social determinants of health: N/A    Disposition considerations: Discharge. I prescribed additional prescription strength medication(s) as charted. See documentation for any additional details.          At the conclusion of the encounter I discussed the results of all of the tests and the disposition. The questions were  "answered. The patient or family acknowledged understanding and was agreeable with the care plan.         MEDICATIONS GIVEN IN THE EMERGENCY:  Medications   traMADol (ULTRAM) tablet 50 mg (50 mg Oral $Given 11/7/23 2592)       NEW PRESCRIPTIONS STARTED AT TODAY'S ER VISIT  New Prescriptions    TRAMADOL (ULTRAM) 50 MG TABLET    Take 1-2 tablets ( mg) by mouth every 6 hours as needed for moderate to severe pain          =================================================================    HPI        Lisandra Elias is a 63 year old female with a pertinent history of  HTN, DM2, who presents to this ED via walk-in for evaluation of epistaxis.     Patient reports a nosebleed from right nare that started yesterday evening around 1910-4132. She states that it stopped about an hour after onset. Tonight when she was going to bed, her nosebleed started up again. She states she was able to get it slow down but then it started \"gushing\" again, prompting her to be seen in the ED. She is not on blood thinners. No prior history of nosebleeds. Patient reports she was recently diagnosed with asthma and reports a history of diabetes and hypertension as well. She notes multiple allergies to medications. Patient does not smoke or drink. No other complaints at this time.    REVIEW OF SYSTEMS     Constitutional:  Denies fever or chills  HENT:  Denies sore throat. Positive for nosebleed from right nare  Respiratory:  Denies cough or shortness of breath   Cardiovascular:  Denies chest pain or palpitations  GI:  Denies abdominal pain, nausea, or vomiting  Musculoskeletal:  Denies any new extremity pain   Skin:  Denies rash   Neurologic:  Denies headache, focal weakness or sensory changes    All other systems reviewed and are negative      PAST MEDICAL HISTORY:  Past Medical History:   Diagnosis Date    Arthritis     Chronic pain     Diabetes mellitus (H)     History of anesthesia complications difficulty waking up    HTN (hypertension)  "    Migraine     Motion sickness     Obese        PAST SURGICAL HISTORY:  Past Surgical History:   Procedure Laterality Date    APPENDECTOMY      COLONOSCOPY N/A 5/25/2023    Procedure: COLONOSCOPY with polypectomies;  Surgeon: Chris Aponte MD;  Location: [facility] Main OR    HYSTERECTOMY N/A 2000's    OOPHORECTOMY Bilateral 2000's    TN EDG US EXAM SURGICAL ALTER STOM DUODENUM/JEJUNUM N/A 11/10/2014    Procedure: ESOPHAGOGASTRODUODENOSCOPY/ENDOSCOPIC ULTRASOUND;  Surgeon: Brian Lawrence MD;  Location: [facility] GI;  Service: Gastroenterology    WISDOM TOOTH EXTRACTION N/A            CURRENT MEDICATIONS:    traMADol (ULTRAM) 50 MG tablet  aspirin 81 MG EC tablet  blood sugar diagnostic (GLUCOSE BLOOD) Strp  cetirizine (ZYRTEC) 10 MG tablet  doxazosin (CARDURA) 8 MG tablet  fluconazole (DIFLUCAN) 200 MG tablet  fluticasone propionate (FLONASE) 50 mcg/actuation nasal spray  folic acid 0.8 MG CAPS  glipiZIDE (GLUCOTROL XL) 10 MG 24 hr tablet  hydroCHLOROthiazide (HYDRODIURIL) 25 MG tablet  Lancets MISC  loperamide (IMODIUM A-D) 2 mg tablet  losartan (COZAAR) 100 MG tablet  metoprolol (TOPROL-XL) 50 MG 24 hr tablet  oxyCODONE (ROXICODONE) 5 MG tablet  pravastatin (PRAVACHOL) 20 MG tablet  Vitamin D3 (CHOLECALCIFEROL) 25 mcg (1000 units) tablet        ALLERGIES:  Allergies   Allergen Reactions    Amlodipine Headache and Unknown    Amoxicillin-Pot Clavulanate Nausea    Clotrimazole-Betamethasone Itching    Codeine Nausea    Gemfibrozil Unknown    Ibuprofen Unknown     Tolerates it, does not take regularly due to diabetes    Lisinopril Cough       FAMILY HISTORY:  Family History   Problem Relation Age of Onset    Heart Disease Father     Ovarian Cancer Mother 83.00       SOCIAL HISTORY:   Social History     Socioeconomic History    Marital status:      Spouse name: None    Number of children: None    Years of education: None    Highest education level: None   Tobacco Use    Smoking status: Former      "Packs/day: 1     Types: Cigarettes     Start date: 1974     Quit date: 1979     Years since quittin.8    Smokeless tobacco: Never   Substance and Sexual Activity    Alcohol use: Yes     Alcohol/week: 1.0 standard drink of alcohol     Types: 1 Standard drinks or equivalent per week     Comment: \"rarely\"    Drug use: Never       VITALS:  Patient Vitals for the past 24 hrs:   BP Temp Temp src Pulse Resp SpO2 Height Weight   23 0325 -- -- -- 99 22 93 % -- --   23 0243 -- 98.3  F (36.8  C) Oral -- -- -- -- --   23 0241 131/78 -- -- 117 28 93 % 1.626 m (5' 4\") 149.7 kg (330 lb)       PHYSICAL EXAM    Constitutional:  Well developed, Well nourished,  HENT:  Normocephalic, Atraumatic, Bilateral external ears normal, Oropharynx moist with blood in posterior oropharynx, nose with active bleeding right nare  Neck:  Normal range of motion, No meningismus, No stridor.   Eyes:  EOMI, Conjunctiva normal, No discharge.   Respiratory:  Normal breath sounds, No respiratory distress, No wheezing, No chest tenderness.   Cardiovascular:  Normal heart rate, Normal rhythm, No murmurs  Musculoskeletal:  Neurovascularly intact distally, No edema, No tenderness, No cyanosis, Good range of motion in all major joints. No tenderness to palpation or major deformities noted.   Integument:  Warm, Dry, No erythema, No rash.   Lymphatic:  No lymphadenopathy noted.   Neurologic:  Alert & oriented , Normal motor function,No focal deficits noted.   Psychiatric:  Affect normal, Judgment normal, Mood normal.          PROCEDURES:  PROCEDURE: Epistaxis Management   INDICATIONS: Failure of epistaxis control with non-invasive management techniques.   PROCEDURE PROVIDER: Dr Tiffany Boudreaux   SITE: Right, Anterior   MEDICATION: None    NOTE: Anterior Source:  The area was evaluated and cleared with nasal suction to locate source of bleeding. The bleeding location was managed with Rapid Rhino (inflatable nasal tamponade " device).  Following treatment the patient was observed and no significant bleeding was noted to recur.       COMPLICATIONS: Patient tolerated procedure well, without complication           I, Natividad Salinas, am serving as a scribe to document services personally performed by Dr. Boudreaux based on my observation and the provider's statements to me. I, Tiffany Boudreaux MD attest that Natividad Salinas is acting in a scribe capacity, has observed my performance of the services and has documented them in accordance with my direction.    Tiffany Boudreaux MD  Emergency Medicine  [facility] EMERGENCY ROOM  [address] [id number] [phone]  Dept: [phone]     Tiffany Boudreaux MD  11/07/23 0331       Tiffany Boudreaux MD  11/07/23 0402

## 2023-11-07 NOTE — ED NOTES
Rhino rocket in R nare and nose clamp using tongue blades applied by ER provider, bleeding controlled.

## 2023-11-07 NOTE — TELEPHONE ENCOUNTER
"Patient got a nose bleed 3 weeks ago, patient went to ED because couldn't get it to stop.    Patient was sent home with \"pinchers\" for future nose bleeds.    Patient has developed another nose bleed this evening.  Patient has used the pinchers again and after an hour thought it stopped but about 20 min later it started \"gushing out\".  Patient put the pinchers back on and used ice.  Patient is still bleeding.    Care advise: no weakness, just tired, will go to ED now.  Advised for someone to take her but patient states she lives alone.    Kari Sainz RN   11/07/23 2:14 AM  [facility] Nurse Advisor  Reason for Disposition   [1] Bleeding present > 30 minutes AND [2] using correct method of direct pressure    Additional Information   Negative: Fainted or too weak to stand following large blood loss   Negative: Sounds like a life-threatening emergency to the triager   Negative: Nosebleed followed a nose injury    Protocols used: Nosebleed-A-AH    "

## 2023-11-13 NOTE — ED NOTES
Pt resting quietly in stretcher; rhino rocket in place in Rt nare. Bleeding looks controlled on that side, she says there is still quite a bit of oozing from the Lt nare but it was controlled while this writer was in the room. She would like something to help manage the pain if possible; will update MD and await further orders.

## 2023-11-13 NOTE — ED PROVIDER NOTES
EMERGENCY DEPARTMENT ENCOUnter      NAME: Lisandra Elias  AGE: 63 year old female  YOB: 1960  MRN: [id number]  EVALUATION DATE & TIME: 11/13/2023 12:22 AM    PCP: Drea Mckeon    ED PROVIDER: Indio Ayala DO      Chief Complaint   Patient presents with    Epistaxis         FINAL IMPRESSION:  1. Epistaxis          ED COURSE & MEDICAL DECISION MAKING:    The patient presented to the emergency department today complaining of a nosebleed.  She has had several nosebleeds recently.  Today she was having active bleeding from the right nare.  A rapid Rhino nasal pack was placed and inflated.  This resulted in good hemostasis.  Plan will be for discharge home with oral antibiotics and instructions to follow-up closely with ENT on an outpatient basis.  She is comfortable with this plan.    Medical Decision Making    History:  Supplemental history from: Documented in chart, if applicable  External Record(s) reviewed: Documented in chart, if applicable.    Work Up:  Chart documentation includes differential considered and any EKGs or imaging independently interpreted by provider, where specified.  In additional to work up documented, I considered the following work up: Documented in chart, if applicable.    External consultation:  Discussion of management with another provider: Documented in chart, if applicable    Complicating factors:  Care impacted by chronic illness: N/A  Care affected by social determinants of health: N/A    Disposition considerations: Discharge. I prescribed additional prescription strength medication(s) as charted. I considered admission, but discharged patient after significant clinical improvement.      PROCEDURE: Epistaxis Management   INDICATIONS: Failure of epistaxis control with non-invasive management techniques.   PROCEDURE PROVIDER: Dr Indio Ayala   SITE: Right, Anterior   MEDICATION: None   NOTE: Anterior Source:  The area was evaluated and cleared with nasal suction to  locate source of bleeding. The bleeding location was managed with Rapid Rhino (inflatable nasal tamponade device).  Following treatment the patient was observed and no significant bleeding was noted to recur.   COMPLICATIONS: Patient tolerated procedure well, without complication         At the conclusion of the encounter I discussed the results of all of the tests and the disposition. The questions were answered. The patient or family acknowledged understanding and was agreeable with the care plan.         MEDICATIONS GIVEN IN THE EMERGENCY:  Medications   ketorolac (TORADOL) injection 15 mg (15 mg Intramuscular $Given 11/13/23 0220)       NEW PRESCRIPTIONS STARTED AT TODAY'S ER VISIT  New Prescriptions    AMOXICILLIN-CLAVULANATE (AUGMENTIN) 875-125 MG TABLET    Take 1 tablet by mouth 2 times daily for 7 days          =================================================================    HPI        Lisandra Elias is a 63 year old female who presents to the emergency department [facility] with complaints of a right-sided nosebleed.  She states that she had similar symptoms last week and came to the ER.  She received a nasal pack at that time and was discharged home.  Several days later she followed up with ENT and underwent cauterization.  Her nosebleed returns today.  She denies any other new symptoms.  No other current complaints.          PAST MEDICAL HISTORY:  Past Medical History:   Diagnosis Date    Arthritis     Chronic pain     Diabetes mellitus (H)     History of anesthesia complications difficulty waking up    HTN (hypertension)     Migraine     Motion sickness     Obese        PAST SURGICAL HISTORY:  Past Surgical History:   Procedure Laterality Date    APPENDECTOMY      COLONOSCOPY N/A 5/25/2023    Procedure: COLONOSCOPY with polypectomies;  Surgeon: Chris Aponte MD;  Location: [facility] Main OR    HYSTERECTOMY N/A 2000's    OOPHORECTOMY Bilateral 2000's    ME EDG US EXAM SURGICAL ALTER STOM  "DUODENUM/JEJUNUM N/A 11/10/2014    Procedure: ESOPHAGOGASTRODUODENOSCOPY/ENDOSCOPIC ULTRASOUND;  Surgeon: Brian Lawrence MD;  Location: [facility];  Service: Gastroenterology    WISDOM TOOTH EXTRACTION N/A            CURRENT MEDICATIONS:    amoxicillin-clavulanate (AUGMENTIN) 875-125 MG tablet  aspirin 81 MG EC tablet  blood sugar diagnostic (GLUCOSE BLOOD) Strp  cetirizine (ZYRTEC) 10 MG tablet  doxazosin (CARDURA) 8 MG tablet  fluconazole (DIFLUCAN) 200 MG tablet  fluticasone propionate (FLONASE) 50 mcg/actuation nasal spray  folic acid 0.8 MG CAPS  glipiZIDE (GLUCOTROL XL) 10 MG 24 hr tablet  hydroCHLOROthiazide (HYDRODIURIL) 25 MG tablet  Lancets MISC  loperamide (IMODIUM A-D) 2 mg tablet  losartan (COZAAR) 100 MG tablet  metoprolol (TOPROL-XL) 50 MG 24 hr tablet  oxyCODONE (ROXICODONE) 5 MG tablet  pravastatin (PRAVACHOL) 20 MG tablet  Vitamin D3 (CHOLECALCIFEROL) 25 mcg (1000 units) tablet        ALLERGIES:  Allergies   Allergen Reactions    Amlodipine Headache and Unknown    Amoxicillin-Pot Clavulanate Nausea    Clotrimazole-Betamethasone Itching    Codeine Nausea    Gemfibrozil Unknown    Ibuprofen Unknown     Tolerates it, does not take regularly due to diabetes    Lisinopril Cough       FAMILY HISTORY:  Family History   Problem Relation Age of Onset    Heart Disease Father     Ovarian Cancer Mother 83.00       SOCIAL HISTORY:   Social History     Socioeconomic History    Marital status:    Tobacco Use    Smoking status: Former     Packs/day: 1     Types: Cigarettes     Start date: 1974     Quit date: 1979     Years since quittin.8    Smokeless tobacco: Never   Substance and Sexual Activity    Alcohol use: Yes     Alcohol/week: 1.0 standard drink of alcohol     Types: 1 Standard drinks or equivalent per week     Comment: \"rarely\"    Drug use: Never         VITAL SIGNS: /81   Pulse 70   Resp 22   Wt 149.7 kg (330 lb)   SpO2 92%   BMI 56.64 kg/m     Constitutional:  Well " developed, Well nourished,  HENT:  Normocephalic, Atraumatic, Oropharynx moist, active epistaxis from the right nare  Neck:  Normal range of motion, Supple, No stridor.   Eyes:  EOMI, Conjunctiva normal, No discharge.   Respiratory:  Breathing comfortably, No respiratory distress,    Cardiovascular:  Normal pulses   Musculoskeletal:  No edema or cyanosis, no deformities  Integument:  Dry, No erythema, No rash.  Neurologic:  Alert & oriented x 3, No obvious focal deficits noted.   Psychiatric:  Affect normal, Judgment normal, Mood normal.            Indio Ayala, DO  Emergency Medicine  [facility] EMERGENCY ROOM  [address] [place] [id number] [phone]  Dept: [phone]     Indio Ayala MD  11/13/23 0249       Indio Ayala MD  11/13/23 0250

## 2023-11-13 NOTE — DISCHARGE INSTRUCTIONS
Keep the nasal packing in place and follow-up with your ENT provider in the next few days for recheck.  In the meantime, take the prescribed antibiotics.  Return to the ER for any worsening symptoms or other concerns.

## 2023-11-13 NOTE — ED TRIAGE NOTES
Presents with nosebleed that started approx 1 hour ago. Denies blood thinners. Was seen here for same last week, had rhino rocket placed and followed up with ENT who removed on Friday and cauterized. Pt spitting up blood. Nose clamp in place on arrival.      Triage Assessment (Adult)       Row Name 11/13/23 0023          Triage Assessment    Airway WDL WDL        Respiratory WDL    Respiratory WDL WDL        Skin Circulation/Temperature WDL    Skin Circulation/Temperature WDL WDL        Cardiac WDL    Cardiac WDL X;rhythm     Pulse Rate & Regularity tachycardic        Peripheral/Neurovascular WDL    Peripheral Neurovascular WDL WDL        Cognitive/Neuro/Behavioral WDL    Cognitive/Neuro/Behavioral WDL WDL

## 2023-11-13 NOTE — ED NOTES
Bleeding is controlled bilateral nares; pt is agreeable with plan to discharge home and outpt ENT follow up

## 2023-11-13 NOTE — ED NOTES
Pt with rhino rocket in place to Rt nare, blood seeping around the packing and some out of the Lt nare. Pt also oozing blood from the Rt inner eye tear duct channel. MD updated re pt request to consider expanding rhino rocket.    ABCs intact, she is able to maintain her airway.

## (undated) DEVICE — TUBING SUCTION MEDI-VAC 1/4"X20' N620A - HE

## (undated) DEVICE — SOL WATER IRRIG 1000ML BOTTLE 2F7114

## (undated) DEVICE — SUCTION MANIFOLD NEPTUNE 2 SYS 1 PORT 702-025-000

## (undated) DEVICE — ENDO SNARE EXACTO COLD 9MM LOOP 2.4MMX230CM 00711115

## (undated) RX ORDER — PROPOFOL 10 MG/ML
INJECTION, EMULSION INTRAVENOUS
Status: DISPENSED
Start: 2023-01-01

## (undated) RX ORDER — ONDANSETRON 2 MG/ML
INJECTION INTRAMUSCULAR; INTRAVENOUS
Status: DISPENSED
Start: 2023-01-01